# Patient Record
Sex: FEMALE | Race: WHITE | NOT HISPANIC OR LATINO | Employment: UNEMPLOYED | URBAN - METROPOLITAN AREA
[De-identification: names, ages, dates, MRNs, and addresses within clinical notes are randomized per-mention and may not be internally consistent; named-entity substitution may affect disease eponyms.]

---

## 2018-07-31 ENCOUNTER — HOSPITAL ENCOUNTER (EMERGENCY)
Facility: CLINIC | Age: 38
Discharge: HOME | End: 2018-07-31
Attending: PHYSICIAN ASSISTANT | Admitting: PHYSICIAN ASSISTANT
Payer: MEDICARE

## 2018-07-31 VITALS
RESPIRATION RATE: 18 BRPM | HEART RATE: 69 BPM | DIASTOLIC BLOOD PRESSURE: 70 MMHG | WEIGHT: 200 LBS | HEIGHT: 72 IN | SYSTOLIC BLOOD PRESSURE: 122 MMHG | OXYGEN SATURATION: 99 % | BODY MASS INDEX: 27.09 KG/M2 | TEMPERATURE: 98 F

## 2018-07-31 DIAGNOSIS — B96.89 BACTERIAL VAGINOSIS: ICD-10-CM

## 2018-07-31 DIAGNOSIS — N30.00 ACUTE CYSTITIS WITHOUT HEMATURIA: ICD-10-CM

## 2018-07-31 DIAGNOSIS — N76.0 BACTERIAL VAGINOSIS: ICD-10-CM

## 2018-07-31 LAB
ALBUMIN UR-MCNC: NEGATIVE MG/DL
APPEARANCE UR: CLEAR
BACTERIA #/AREA URNS HPF: ABNORMAL /HPF
BILIRUB UR QL STRIP: NEGATIVE
COLOR UR AUTO: ABNORMAL
GLUCOSE UR STRIP-MCNC: NEGATIVE MG/DL
HCG UR QL: NEGATIVE
HGB UR QL STRIP: NEGATIVE
KETONES UR STRIP-MCNC: NEGATIVE MG/DL
LEUKOCYTE ESTERASE UR QL STRIP: ABNORMAL
MUCOUS THREADS #/AREA URNS LPF: PRESENT /LPF
NITRATE UR QL: POSITIVE
PH UR STRIP: 6 PH (ref 5–7)
RBC #/AREA URNS AUTO: 1 /HPF (ref 0–2)
SOURCE: ABNORMAL
SP GR UR STRIP: 1 (ref 1–1.03)
SPECIMEN SOURCE: ABNORMAL
SQUAMOUS #/AREA URNS AUTO: <1 /HPF (ref 0–1)
UROBILINOGEN UR STRIP-MCNC: 0 MG/DL (ref 0–2)
WBC #/AREA URNS AUTO: 35 /HPF (ref 0–5)
WET PREP SPEC: ABNORMAL

## 2018-07-31 PROCEDURE — 87186 SC STD MICRODIL/AGAR DIL: CPT | Performed by: PHYSICIAN ASSISTANT

## 2018-07-31 PROCEDURE — 81025 URINE PREGNANCY TEST: CPT | Performed by: PHYSICIAN ASSISTANT

## 2018-07-31 PROCEDURE — G0463 HOSPITAL OUTPT CLINIC VISIT: HCPCS | Performed by: PHYSICIAN ASSISTANT

## 2018-07-31 PROCEDURE — 99204 OFFICE O/P NEW MOD 45 MIN: CPT | Mod: Z6 | Performed by: PHYSICIAN ASSISTANT

## 2018-07-31 PROCEDURE — 87086 URINE CULTURE/COLONY COUNT: CPT | Performed by: PHYSICIAN ASSISTANT

## 2018-07-31 PROCEDURE — 81001 URINALYSIS AUTO W/SCOPE: CPT | Performed by: PHYSICIAN ASSISTANT

## 2018-07-31 PROCEDURE — 87210 SMEAR WET MOUNT SALINE/INK: CPT | Performed by: PHYSICIAN ASSISTANT

## 2018-07-31 PROCEDURE — 87088 URINE BACTERIA CULTURE: CPT | Performed by: PHYSICIAN ASSISTANT

## 2018-07-31 RX ORDER — METRONIDAZOLE 500 MG/1
500 TABLET ORAL 2 TIMES DAILY
Qty: 14 TABLET | Refills: 0 | Status: SHIPPED | OUTPATIENT
Start: 2018-07-31 | End: 2018-08-07

## 2018-07-31 RX ORDER — NITROFURANTOIN 25; 75 MG/1; MG/1
100 CAPSULE ORAL 2 TIMES DAILY
Qty: 14 CAPSULE | Refills: 0 | Status: SHIPPED | OUTPATIENT
Start: 2018-07-31 | End: 2018-08-07

## 2018-07-31 RX ORDER — FLUCONAZOLE 150 MG/1
TABLET ORAL
Qty: 2 TABLET | Refills: 0 | Status: SHIPPED | OUTPATIENT
Start: 2018-07-31 | End: 2018-08-03

## 2018-07-31 ASSESSMENT — ENCOUNTER SYMPTOMS
BACK PAIN: 0
VOMITING: 0
NAUSEA: 0
FLANK PAIN: 0
DYSURIA: 1
FREQUENCY: 1
DIARRHEA: 0
ABDOMINAL PAIN: 0
FEVER: 0

## 2018-07-31 NOTE — DISCHARGE INSTRUCTIONS
Use Medication as directed; no alcohol with flagyl will cause vomiting.     Patient advised to call for any lab results (if obtained during visit) within 2-3 days. Urine culture pending.   Drink plenty of fluids.     Prevention and treatment of UTI's discussed.  Signs and symptoms of pyelonephritis mentioned.    Patient to return to clinic sooner if not improving as expected.   Go to ER if any worsening symptoms or signs/symptoms of phylonephritis occur.

## 2018-07-31 NOTE — ED AVS SNAPSHOT
[facility] Emergency Department    [address] [address]    Phone:  [phone]    Fax:  [phone]                                       Darlene Grigsby   MRN: [id number]    Department:  [facility] Emergency Department   Date of Visit:  7/31/2018           After Visit Summary Signature Page     I have received my discharge instructions, and my questions have been answered. I have discussed any challenges I see with this plan with the nurse or doctor.    ..........................................................................................................................................  Patient/Patient Representative Signature      ..........................................................................................................................................  Patient Representative Print Name and Relationship to Patient    ..................................................               ................................................  Date                                            Time    ..........................................................................................................................................  Reviewed by Signature/Title    ...................................................              ..............................................  Date                                                            Time

## 2018-07-31 NOTE — ED AVS SNAPSHOT
[facility] Emergency Department    [address] [address]    Phone:  [phone]    Fax:  [phone]                                       Darlene Grigsby   MRN: [id number]    Department:  [facility] Emergency Department   Date of Visit:  7/31/2018           Patient Information     Date Of Birth          1980        Your diagnoses for this visit were:     Acute cystitis without hematuria     Bacterial vaginosis        You were seen by Tiff Crisostomo PA-C.      Follow-up Information     Follow up with No Ref-Primary, Physician.    Why:  As needed        Follow up with [facility] Emergency Department.    Specialty:  EMERGENCY MEDICINE    Why:  As needed, If symptoms worsen    Contact information:    [address] [address] [phone]    Additional information:    The medical center is located at   [address]. (between [address] and   [address] in [place], [place] miles north   of [place]).        Discharge Instructions       Use Medication as directed; no alcohol with flagyl will cause vomiting.     Patient advised to call for any lab results (if obtained during visit) within 2-3 days. Urine culture pending.   Drink plenty of fluids.     Prevention and treatment of UTI's discussed.  Signs and symptoms of pyelonephritis mentioned.    Patient to return to clinic sooner if not improving as expected.   Go to ER if any worsening symptoms or signs/symptoms of phylonephritis occur.             24 Hour Appointment Hotline       To make an appointment at any [facility] clinic, call [phone] ([phone]). If you don't have a family doctor or clinic, we will help you find one. [facility] clinics are conveniently located to serve the needs of you and your family.             Review of your medicines      START taking        Dose / Directions Last dose taken    fluconazole 150 MG tablet   Commonly known as:  DIFLUCAN   Quantity:  2 tablet         Take one at first signs/symptoms of yeast infection, and can repeat in 1 week if symptoms persist.   Refills:  0        metroNIDAZOLE 500 MG tablet   Commonly known as:  FLAGYL   Dose:  500 mg   Quantity:  14 tablet        Take 1 tablet (500 mg) by mouth 2 times daily for 7 days   Refills:  0        nitroFURantoin (macrocrystal-monohydrate) 100 MG capsule   Commonly known as:  MACROBID   Dose:  100 mg   Quantity:  14 capsule        Take 1 capsule (100 mg) by mouth 2 times daily for 7 days   Refills:  0          Our records show that you are taking the medicines listed below. If these are incorrect, please call your family doctor or clinic.        Dose / Directions Last dose taken    guaiFENesin-codeine 100-10 MG/5ML Soln solution   Commonly known as:  ROBITUSSIN AC   Dose:  1-2 tsp.   Quantity:  180 mL        Take 5-10 mLs by mouth every 6 hours as needed.   Refills:  0        TYLENOL COLD PLUS COUGH CHILD PO        Take  by mouth.   Refills:  0                Prescriptions were sent or printed at these locations (3 Prescriptions)                   [facility] [address] [place] [address] AT [address]   [address] [facility] [address]    Telephone:  [phone]   Fax:  [phone]   Hours:                  E-Prescribed (2 of 3)         metroNIDAZOLE (FLAGYL) 500 MG tablet               nitroFURantoin, macrocrystal-monohydrate, (MACROBID) 100 MG capsule                 Printed at Department/Unit printer (1 of 3)         fluconazole (DIFLUCAN) 150 MG tablet                Procedures and tests performed during your visit     HCG qualitative urine (UPT)    UA reflex to Microscopic    Urine Culture    Wet prep      Orders Needing Specimen Collection     None      Pending Results     Date and Time Order Name Status Description    7/31/2018 1734 Urine Culture In process             Pending Culture Results     Date and Time Order Name Status Description    7/31/2018  1734 Urine Culture In process             Pending Results Instructions     If you had any lab results that were not finalized at the time of your Discharge, you can call the ED Lab Result RN at [phone]. You will be contacted by this team for any positive Lab results or changes in treatment. The nurses are available 7 days a week from 10A to 6:30P.  You can leave a message 24 hours per day and they will return your call.        Test Results From Your Hospital Stay        7/31/2018  4:44 PM      Component Results     Component Value Ref Range & Units Status    Color Urine Suad  Final    Appearance Urine Clear  Final    Glucose Urine Negative NEG^Negative mg/dL Final    Bilirubin Urine Negative NEG^Negative Final    Ketones Urine Negative NEG^Negative mg/dL Final    Specific Gravity Urine 1.003 1.003 - 1.035 Final    Blood Urine Negative NEG^Negative Final    pH Urine 6.0 5.0 - 7.0 pH Final    Protein Albumin Urine Negative NEG^Negative mg/dL Final    Urobilinogen mg/dL 0.0 0.0 - 2.0 mg/dL Final    Nitrite Urine Positive (A) NEG^Negative Final    Leukocyte Esterase Urine Small (A) NEG^Negative Final    Source Midstream Urine  Final    RBC Urine 1 0 - 2 /HPF Final    WBC Urine 35 (H) 0 - 5 /HPF Final    Bacteria Urine Moderate (A) NEG^Negative /HPF Final    Squamous Epithelial /HPF Urine <1 0 - 1 /HPF Final    Mucous Urine Present (A) NEG^Negative /LPF Final         7/31/2018  4:35 PM      Component Results     Component Value Ref Range & Units Status    HCG Qual Urine Negative NEG^Negative Final    This test is for screening purposes.  Results should be interpreted along with   the clinical picture.  Confirmation testing is available if warranted by   ordering [id number], HCG Quantitative Pregnancy.           7/31/2018  5:21 PM      Component Results     Component    Specimen Description    Vagina    Wet Prep    No Trichomonas seen    Wet Prep    No yeast seen    Wet Prep    Few  WBC'S seen      Wet Prep (Abnormal)  "   Clue cells seen         2018  5:39 PM                Thank you for choosing [facility]       Thank you for choosing [facility] for your care. Our goal is always to provide you with excellent care. Hearing back from our patients is one way we can continue to improve our services. Please take a few minutes to complete the written survey that you may receive in the mail after you visit with us. Thank you!        [employer] Information     [employer]! lets you send messages to your doctor, view your test results, renew your prescriptions, schedule appointments and more. To sign up, go to [email or web address] . Click on \"Log in\" on the left side of the screen, which will take you to the Welcome page. Then click on \"Sign up Now\" on the right side of the page.     You will be asked to enter the access code listed below, as well as some personal information. Please follow the directions to create your username and password.     Your access code is: [email or web address]  Expires: 10/29/2018  5:40 PM     Your access code will  in 90 days. If you need help or a new code, please call your [facility] clinic or [phone].        Care EveryWhere ID     This is your Care EveryWhere ID. This could be used by other organizations to access your [facility] medical records  [id number]        Equal Access to Services     DENIA ARGUELLO AH: Roosevelt roa Soelizabeth, waaxda luqadaha, qaybta kaalmada bran, jem santos. So [facility] [phone].    ATENCIÓN: Si habla español, tiene a luo disposición servicios gratuitos de asistencia lingüística. Llame al [phone].    We comply with applicable federal civil rights laws and [place] laws. We do not discriminate on the basis of race, color, national origin, age, disability, sex, sexual orientation, or gender identity.            After Visit Summary       This is your record. Keep this with you and show to your community pharmacist(s) and doctor(s) at your next " visit.

## 2018-07-31 NOTE — ED PROVIDER NOTES
History     Chief Complaint   Patient presents with     UTI     small possibility of pg also     HPI    Darlene Grigsby is a 38 year old female who  presents today with urinary symptoms. Symptoms of dysuria, urgency, frequency and burning have been going on for 1week(s) that is better in the morning and worse in the evenings.  Hematuria no.  gradual onset, still present and waxes and wanes and mild.  This patient does not have a history of urinary tract infections.   Vaginal symptoms slight mild yellowish discharge     Any history of STDs HPV positive in past with normal pap smears since.   Patient currently sexually active  Patient denies back pain, fevers, abdominal pain, nausea/vomiting, or vaginal pain.    Problem list, Medication list, Allergies, and Medical/Social/Surgical histories reviewed in [employer] and updated as appropriate.    Problem List:    There are no active problems to display for this patient.       Past Medical History:    No past medical history on file.    Past Surgical History:    No past surgical history on file.    Family History:    No family history on file.    Social History:  Marital Status:  Single [1]  Social History   Substance Use Topics     Smoking status: Never Smoker     Smokeless tobacco: Not on file     Alcohol use Yes      Comment: seldom        Medications:      fluconazole (DIFLUCAN) 150 MG tablet   metroNIDAZOLE (FLAGYL) 500 MG tablet   nitroFURantoin, macrocrystal-monohydrate, (MACROBID) 100 MG capsule   guaiFENesin-codeine (ROBITUSSIN AC) 100-10 MG/5ML SOLN   [id number] (TYLENOL COLD PLUS COUGH CHILD PO)         Review of Systems   Constitutional: Negative for fever.   Gastrointestinal: Negative for abdominal pain, diarrhea, nausea and vomiting.   Genitourinary: Positive for dysuria, frequency, urgency and vaginal discharge. Negative for flank pain, pelvic pain, vaginal bleeding and vaginal pain.   Musculoskeletal: Negative for back pain.   All other systems  reviewed and are negative.      Physical Exam   BP: 122/70  Pulse: 69  Temp: 98  F (36.7  C)  Resp: 18  Height: 182.9 cm (6')  Weight: 90.7 kg (200 lb)  SpO2: 99 %      Physical Exam     /70  Pulse 69  Temp 98  F (36.7  C) (Temporal)  Resp 18  Ht 1.829 m (6')  Wt 90.7 kg (200 lb)  SpO2 99%  BMI 27.12 kg/m2    GENERAL APPEARANCE: healthy, alert and no distress  RESP: lungs clear to auscultation - no rales, rhonchi or wheezes  CV: regular rates and rhythm, normal S1 S2, no murmur noted  ABDOMEN:  soft, nontender, no HSM or masses and bowel sounds normal  BACK: No CVA tenderness  GU_female: deferred, patient obtained wet prep herself in office.   SKIN: no suspicious lesions or rashes    Offered GC/Chlamydia testing in office today; patient declined and declined pelvic exam.     Results for orders placed or performed during the hospital encounter of 07/31/18 (from the past 24 hour(s))   HCG qualitative urine (UPT)   Result Value Ref Range    HCG Qual Urine Negative NEG^Negative     Results for orders placed or performed during the hospital encounter of 07/31/18   UA reflex to Microscopic   Result Value Ref Range    Color Urine Suad     Appearance Urine Clear     Glucose Urine Negative NEG^Negative mg/dL    Bilirubin Urine Negative NEG^Negative    Ketones Urine Negative NEG^Negative mg/dL    Specific Gravity Urine 1.003 1.003 - 1.035    Blood Urine Negative NEG^Negative    pH Urine 6.0 5.0 - 7.0 pH    Protein Albumin Urine Negative NEG^Negative mg/dL    Urobilinogen mg/dL 0.0 0.0 - 2.0 mg/dL    Nitrite Urine Positive (A) NEG^Negative    Leukocyte Esterase Urine Small (A) NEG^Negative    Source Midstream Urine     RBC Urine 1 0 - 2 /HPF    WBC Urine 35 (H) 0 - 5 /HPF    Bacteria Urine Moderate (A) NEG^Negative /HPF    Squamous Epithelial /HPF Urine <1 0 - 1 /HPF    Mucous Urine Present (A) NEG^Negative /LPF   HCG qualitative urine (UPT)   Result Value Ref Range    HCG Qual Urine Negative NEG^Negative   Wet  prep   Result Value Ref Range    Specimen Description Vagina     Wet Prep No Trichomonas seen     Wet Prep No yeast seen     Wet Prep Few  WBC'S seen       Wet Prep Clue cells seen (A)              ED Course     ED Course     Procedures              Critical Care time:  none               Results for orders placed or performed during the hospital encounter of 07/31/18 (from the past 24 hour(s))   UA reflex to Microscopic   Result Value Ref Range    Color Urine Suad     Appearance Urine Clear     Glucose Urine Negative NEG^Negative mg/dL    Bilirubin Urine Negative NEG^Negative    Ketones Urine Negative NEG^Negative mg/dL    Specific Gravity Urine 1.003 1.003 - 1.035    Blood Urine Negative NEG^Negative    pH Urine 6.0 5.0 - 7.0 pH    Protein Albumin Urine Negative NEG^Negative mg/dL    Urobilinogen mg/dL 0.0 0.0 - 2.0 mg/dL    Nitrite Urine Positive (A) NEG^Negative    Leukocyte Esterase Urine Small (A) NEG^Negative    Source Midstream Urine     RBC Urine 1 0 - 2 /HPF    WBC Urine 35 (H) 0 - 5 /HPF    Bacteria Urine Moderate (A) NEG^Negative /HPF    Squamous Epithelial /HPF Urine <1 0 - 1 /HPF    Mucous Urine Present (A) NEG^Negative /LPF   HCG qualitative urine (UPT)   Result Value Ref Range    HCG Qual Urine Negative NEG^Negative   Wet prep   Result Value Ref Range    Specimen Description Vagina     Wet Prep No Trichomonas seen     Wet Prep No yeast seen     Wet Prep Few  WBC'S seen       Wet Prep Clue cells seen (A)        Medications - No data to display    Assessments & Plan (with Medical Decision Making)     I have reviewed the nursing notes.    I have reviewed the findings, diagnosis, plan and need for follow up with the patient.   UTI: treated with macrobid. Urine culture pending.  Patient informed of signs and symptoms of pyelonephritis to watch for and if these symptoms occur she is to return to the ED for further treatment and evaluation.    Bacterial vaginosis: treated with flagyl. Patient informed of  no alcohol while on this can cause vomiting.     Patient also given printed off prescription for Diflucan to use if she develops a vaginal yeast infection.  Patient forms of signs and symptoms to watch for and if this occurs she is to take this prescription.    She to follow-up with primary care provider in 1-2 weeks symptoms persist or fail to improve.    New Prescriptions    FLUCONAZOLE (DIFLUCAN) 150 MG TABLET    Take one at first signs/symptoms of yeast infection, and can repeat in 1 week if symptoms persist.    METRONIDAZOLE (FLAGYL) 500 MG TABLET    Take 1 tablet (500 mg) by mouth 2 times daily for 7 days    NITROFURANTOIN, MACROCRYSTAL-MONOHYDRATE, (MACROBID) 100 MG CAPSULE    Take 1 capsule (100 mg) by mouth 2 times daily for 7 days       Final diagnoses:   Acute cystitis without hematuria   Bacterial vaginosis       7/31/2018   [facility] EMERGENCY DEPARTMENT     Tiff Crisostomo PA-C  07/31/18 5821

## 2018-08-03 ENCOUNTER — TELEPHONE (OUTPATIENT)
Dept: EMERGENCY MEDICINE | Facility: CLINIC | Age: 38
End: 2018-08-03

## 2018-08-03 DIAGNOSIS — N30.00 ACUTE CYSTITIS WITHOUT HEMATURIA: ICD-10-CM

## 2018-08-03 LAB
BACTERIA SPEC CULT: ABNORMAL
BACTERIA SPEC CULT: ABNORMAL
Lab: ABNORMAL
SPECIMEN SOURCE: ABNORMAL

## 2018-08-03 RX ORDER — SULFAMETHOXAZOLE/TRIMETHOPRIM 800-160 MG
1 TABLET ORAL 2 TIMES DAILY
Qty: 6 TABLET | Refills: 0 | Status: CANCELLED | OUTPATIENT
Start: 2018-08-03 | End: 2018-08-06

## 2018-08-03 NOTE — TELEPHONE ENCOUNTER
[facility] Emergency Department Lab result notification [Adult-Female]    [place] ED lab result protocol used  Urine Culture    Reason for call  Notify of lab results, assess symptoms,  review ED providers recommendations/discharge instructions (if necessary) and advise per ED lab result f/u protocol    Lab Result (including Rx patient on, if applicable)  Final Urine Culture Report on 8/3/18  Emergency Dept discharge antibiotic prescribed: Nitrofurantoin Macrocrystal-Monohydrate (Macrobid) 100 mg PO capsule, 1 capsule (100 mg) by mouth 2 times daily for 7 days  #1. Bacteria, 10,000 - 50,000 colonies/mL Proteus mirabilis,  is [RESISTANT] to antibiotic.   Change in treatment as per [place] ED Lab result protocol    Information table from ED Provider visit on 7/31/18  ED diagnosis Acute cystitis without hematuria   ED provider Tiff Crisostomo PA-C   Symptoms reported at ED visit (Chief complaint, HPI) Darlene Grigsby is a 38 year old female who  presents today with urinary symptoms. Symptoms of dysuria, urgency, frequency and burning have been going on for 1week(s) that is better in the morning and worse in the evenings.  Hematuria no.  gradual onset, still present and waxes and wanes and mild.  This patient does not have a history of urinary tract infections.   Vaginal symptoms slight mild yellowish discharge      Any history of STDs HPV positive in past with normal pap smears since.   Patient currently sexually active  Patient denies back pain, fevers, abdominal pain, nausea/vomiting, or vaginal pain.      ED providers Impression and Plan (applicable information)  UTI: treated with macrobid. Urine culture pending.  Patient informed of signs and symptoms of pyelonephritis to watch for and if these symptoms occur she is to return to the ED for further treatment and evaluation.     Bacterial vaginosis: treated with flagyl. Patient informed of no alcohol while on this can cause vomiting.      Patient  also given printed off prescription for Diflucan to use if she develops a vaginal yeast infection.  Patient forms of signs and symptoms to watch for and if this occurs she is to take this prescription.     She to follow-up with primary care provider in 1-2 weeks symptoms persist or fail to improve   Significant Medical hx, if applicable None   Coumadin/Warfarin [Yes /No] No   Creatinine Level (mg/dl) Not available   Creatinine clearance (ml/min), if applicable Not available   Pregnant (Yes/No/NA) No   Breastfeeding (Yes/No/NA) No   Allergies Contrast dye   Weight, if applicable N/A      RN Assessment (Patient s current Symptoms), include time called.  [Insert Left message here if message left]  Msg left 8/3/18 & 8/4/18.  Medication pending patient contact.      PCP follow-up Questions asked: NO    Mable Arizmendi RN    [facility] RN  Lung Nodule and ED Lab Results F/U RN  Epic pool (ED late result f/u RN) : P [id number]   # [phone]    Copy of Lab result   Order   Urine Culture [[id number]] (Order [id number])   Exam Information   Exam Date Exam Time Accession # Results    7/31/18  4:15 PM [id number]    Component Results   Component Collected Lab   Specimen Description 07/31/2018  4:15    Midstream Urine   Special Requests 07/31/2018  4:15 PM 75   Specimen received in preservative   Culture Micro (Abnormal) 07/31/2018  4:15    10,000 to 50,000 colonies/mL   Proteus mirabilis      Culture Micro 07/31/2018  4:15    <10,000 colonies/mL   mixed urogenital jaden      Culture & Susceptibility   PROTEUS MIRABILIS   Antibiotic Interpretation Sensitivity Unit Method Status   AMPICILLIN Sensitive <=2 ug/mL ARACELY Final   AMPICILLIN/SULBACTAM Sensitive <=2 ug/mL ARACELY Final   CEFAZOLIN Sensitive <=4 ug/mL ARACELY Final   Comment: Cefazolin ARACELY breakpoints are for the treatment of uncomplicated urinary tract   infections.  For the treatment of systemic infections, please contact the   laboratory for  additional testing.   CEFEPIME Sensitive <=1 ug/mL ARACELY Final   CEFOXITIN Sensitive <=4 ug/mL ARACELY Final   CEFTAZIDIME Sensitive <=1 ug/mL ARACELY Final   CEFTRIAXONE Sensitive <=1 ug/mL ARACELY Final   CIPROFLOXACIN Sensitive <=0.25 ug/mL ARACELY Final   GENTAMICIN Sensitive <=1 ug/mL ARACELY Final   LEVOFLOXACIN Sensitive <=0.12 ug/mL ARACELY Final   NITROFURANTOIN Resistant 128 ug/mL ARACELY Final   Comment: Intrinsically Resistant   Piperacillin/Tazo Sensitive <=4 ug/mL ARACELY Final   TOBRAMYCIN Sensitive <=1 ug/mL ARACELY Final   Trimethoprim/Sulfa Sensitive <=1/19 ug/mL ARACELY Final

## 2018-08-10 NOTE — TELEPHONE ENCOUNTER
[facility] Emergency Department Lab result notification     Patient/parent Name  Darlene Grigsby    RN Assessment (Patient s current Symptoms), include time called.  [Insert Left message here if message left]  Is now asymptomatic.    RN Recommendations/Instructions per [facility] ED lab result protocol  F/u with PCP for re stesting as desired.       Please Contact your PCP clinic or return to the Emergency department if your:    Symptoms return.    Symptoms worsen or other concerning symptom's.    PCP follow-up Questions asked: YES       Mable Arizmendi RN    [facility] RN  Lung Nodule and ED Lab Results F/U RN  Epic pool (ED late result f/u RN) : P [id number]   # [phone]

## 2018-08-28 ENCOUNTER — OFFICE VISIT (OUTPATIENT)
Dept: FAMILY MEDICINE | Facility: CLINIC | Age: 38
End: 2018-08-28
Payer: MEDICAID

## 2018-08-28 VITALS
SYSTOLIC BLOOD PRESSURE: 118 MMHG | HEIGHT: 71 IN | WEIGHT: 198.4 LBS | HEART RATE: 78 BPM | TEMPERATURE: 98.5 F | OXYGEN SATURATION: 98 % | DIASTOLIC BLOOD PRESSURE: 78 MMHG | BODY MASS INDEX: 27.77 KG/M2

## 2018-08-28 DIAGNOSIS — R00.2 PALPITATIONS: ICD-10-CM

## 2018-08-28 DIAGNOSIS — N63.12 BREAST LUMP ON RIGHT SIDE AT 1 O'CLOCK POSITION: Primary | ICD-10-CM

## 2018-08-28 DIAGNOSIS — Z98.890 HISTORY OF BREAST LUMP/MASS EXCISION: ICD-10-CM

## 2018-08-28 PROCEDURE — 99213 OFFICE O/P EST LOW 20 MIN: CPT | Performed by: NURSE PRACTITIONER

## 2018-08-28 RX ORDER — SUMATRIPTAN SUCCINATE 6 MG/.5ML
INJECTION, SOLUTION SUBCUTANEOUS ONCE
COMMUNITY

## 2018-08-28 NOTE — PROGRESS NOTES
SUBJECTIVE:   Darlene Grigsby is a 38 year old female who presents to clinic today for the following health issues:  Chief Complaint   Patient presents with     Breast Problem     lump on right breast, breast cancer runs in the family (aunt and grandma)      pulse     feels like her pulse is racing and is higher lately   Patient has anxiety and is not sure if she can correlate this to her anxiety.  When it occurs it lasts a short time and goes up to 100-120.  No chest pain or shortness of breath with symptoms.      Lump on Right breast      Duration: Just noticed it a couple days ago    Description (location/character/radiation): tender to touch, above the nipple    Intensity:  mild    Accompanying signs and symptoms: none    History (similar episodes/previous evaluation): yes have had a lump right next to it 1-2 years ago     Problem list and histories reviewed & adjusted, as indicated.  Additional history: as documented    Patient Active Problem List   Diagnosis   (none) - all problems resolved or deleted     No past surgical history on file.    Social History   Substance Use Topics     Smoking status: Never Smoker     Smokeless tobacco: Never Used     Alcohol use Yes      Comment: seldom     Family History   Problem Relation Age of Onset     Breast Cancer Maternal Grandmother      Breast Cancer Other          Current Outpatient Prescriptions   Medication Sig Dispense Refill     SUMATRIPTAN SUCCINATE PO        SUMAtriptan Succinate Refill (IMITREX) 6 MG/0.5ML SOCT Inject Subcutaneous once       guaiFENesin-codeine (ROBITUSSIN AC) 100-10 MG/5ML SOLN Take 5-10 mLs by mouth every 6 hours as needed. (Patient not taking: Reported on 8/28/2018) 180 mL 0     Pzsybuhxb-PRQ-GX-APAP (TYLENOL COLD PLUS COUGH CHILD PO) Take  by mouth.       Allergies   Allergen Reactions     Contrast Dye      Macrobid [Nitrofurantoin]      Felt like med was giving her seizures, did see flickering lights while on it.       Reviewed and  "updated as needed this visit by clinical staff  Allergies  Meds  Problems       Reviewed and updated as needed this visit by Provider  Allergies  Meds  Problems         ROS:  CONSTITUTIONAL: NEGATIVE for fever, chills, change in weight  RESP: NEGATIVE for significant cough or SOB  CV: NEGATIVE for chest pain, palpitations or peripheral edema  GI: NEGATIVE for nausea, abdominal pain, heartburn, or change in bowel habits  BREAST:  Lump in right upper breast just next to incision from previous lumpectomy.  Patient had benign tissue on excision of prior lump in this area.   PSYCHIATRIC: NEGATIVE for changes in mood or affect, hx of anxiety  ROS otherwise negative    OBJECTIVE:     /78  Pulse 78  Temp 98.5  F (36.9  C) (Tympanic)  Ht 5' 11\" (1.803 m)  Wt 198 lb 6.4 oz (90 kg)  SpO2 98%  BMI 27.67 kg/m2  Body mass index is 27.67 kg/(m^2).  GENERAL: healthy, alert and no distress  RESP: lungs clear to auscultation - no rales, rhonchi or wheezes  BREAST: no palpable axillary masses or adenopathy, mass right breast 2 o'clock just medial to prior excision incision that is mobile and pea sized, tenderness right mass and well healed biopsy incision right upper breast just medial from center.  CV: regular rate and rhythm, normal S1 S2, no S3 or S4, no murmur, click or rub, no peripheral edema and peripheral pulses strong  PSYCH: mentation appears normal, affect normal/bright    Diagnostic Test Results:  none     ASSESSMENT/PLAN:     1. Breast lump on right side at 1 o'clock position  Ordered Mammo and US for evaluation.  Pt will schedule and she will be notified of results.    - MA Diagnostic Digital Right; Future  - US Breast Right Complete 4 Quadrants; Future    2. History of breast lump/mass excision  Ordered Mammo and US for evaluation.  Patient will schedule and she will be notified of results.  - MA Diagnostic Digital Right; Future  - US Breast Right Complete 4 Quadrants; Future    3. Palpitations  Not " regular symptoms.  Currently normal pulse.  Patient was concerned that HR of 100 was high.  Has been under some stress but not sure if this is related.  No symptoms with exertion or exercise.  Denies any syncope or near syncope with episodes.   Discussed for her to check her heart rate when this occurs to evaluate over 1 minute how many beats she has.  She should date and time and write any precipitating occurrences that happen also.  If the HR is over 100 and consistent or persistent, would recommend f/u with holter monitor to evaluate.        See Patient Instructions    Lisbeth Cody NP  [facility]

## 2018-08-28 NOTE — MR AVS SNAPSHOT
After Visit Summary   8/28/2018    Darlene Grigsby    MRN: [id number]           Patient Information     Date Of Birth          1980        Visit Information        Provider Department      8/28/2018 11:00 AM Lisbeth Cody NP [facility]        Today's Diagnoses     Breast lump on right side at 1 o'clock position    -  1    History of breast lump/mass excision          Care Instructions    1.  Schedule Mammography and US of right breast in Radiology.  2.  Use tylenol or Ibuprofen for discomfort.  3.  Either myself or radiologist will follow-up with you on results.  4.  Track pulse when you think it is high and count beats over 1 minute.  Record Date, Time and anything occurring around the time it happens.  If this is high and frequent, follow-up in clinic.  Normal heart rate is .          Follow-ups after your visit        Follow-up notes from your care team     Return if symptoms worsen or fail to improve.      Future tests that were ordered for you today     Open Future Orders        Priority Expected Expires Ordered    MA Diagnostic Digital Right Routine  8/28/2019 8/28/2018    US Breast Right Complete 4 Quadrants Routine  8/28/2019 8/28/2018            Who to contact     If you have questions or need follow up information about today's clinic visit or your schedule please contact [facility] directly at [phone].  Normal or non-critical lab and imaging results will be communicated to you by MyChart, letter or phone within 4 business days after the clinic has received the results. If you do not hear from us within 7 days, please contact the clinic through MyChart or phone. If you have a critical or abnormal lab result, we will notify you by phone as soon as possible.  Submit refill requests through Appear Here or call your pharmacy and they will forward the refill request to us. Please allow 3 business days for your refill to be completed.           "Additional Information About Your Visit        MyChart Information     Headroom lets you send messages to your doctor, view your test results, renew your prescriptions, schedule appointments and more. To sign up, go to [email or web address] . Click on \"Log in\" on the left side of the screen, which will take you to the Welcome page. Then click on \"Sign up Now\" on the right side of the page.     You will be asked to enter the access code listed below, as well as some personal information. Please follow the directions to create your username and password.     Your access code is: [email or web address]  Expires: 10/29/2018  5:40 PM     Your access code will  in 90 days. If you need help or a new code, please call your [place] clinic or [phone].        Care EveryWhere ID     This is your Care EveryWhere ID. This could be used by other organizations to access your [place] medical records  [id number]        Your Vitals Were     Pulse Temperature Height Pulse Oximetry BMI (Body Mass Index)       78 98.5  F (36.9  C) (Tympanic) 5' 11\" (1.803 m) 98% 27.67 kg/m2        Blood Pressure from Last 3 Encounters:   18 118/78   18 122/70   12 130/84    Weight from Last 3 Encounters:   18 198 lb 6.4 oz (90 kg)   18 200 lb (90.7 kg)   12 180 lb (81.6 kg)               Primary Care Provider Fax #    Physician No Ref-Primary [phone]       No address on file        Equal Access to Services     [facility]: Hadii kat roa Soelizabeth, waaxda luqadaha, qaybta kaalmada jem sotomayor. So [facility] [phone].    ATENCIÓN: Si habla español, tiene a luo disposición servicios gratuitos de asistencia lingüística. Llame al [phone].    We comply with applicable federal civil rights laws and [place] laws. We do not discriminate on the basis of race, color, national origin, age, disability, sex, sexual orientation, or gender identity.            Thank you!  "    Thank you for choosing [facility]  for your care. Our goal is always to provide you with excellent care. Hearing back from our patients is one way we can continue to improve our services. Please take a few minutes to complete the written survey that you may receive in the mail after your visit with us. Thank you!             Your Updated Medication List - Protect others around you: Learn how to safely use, store and throw away your medicines at www.disposemymeds.org.          This list is accurate as of 8/28/18 11:58 AM.  Always use your most recent med list.                   Brand Name Dispense Instructions for use Diagnosis    guaiFENesin-codeine 100-10 MG/5ML Soln solution    ROBITUSSIN AC    180 mL    Take 5-10 mLs by mouth every 6 hours as needed.    Cough, URI (upper respiratory infection)       * SUMAtriptan Succinate Refill 6 MG/0.5ML Soct    IMITREX     Inject Subcutaneous once        * SUMATRIPTAN SUCCINATE PO           TYLENOL COLD PLUS COUGH CHILD PO      Take  by mouth.    Acute pharyngitis       * Notice:  This list has 2 medication(s) that are the same as other medications prescribed for you. Read the directions carefully, and ask your doctor or other care provider to review them with you.

## 2018-08-28 NOTE — PATIENT INSTRUCTIONS
1.  Schedule Mammography and US of right breast in Radiology.  2.  Use tylenol or Ibuprofen for discomfort.  3.  Either myself or radiologist will follow-up with you on results.  4.  Track pulse when you think it is high and count beats over 1 minute.  Record Date, Time and anything occurring around the time it happens.  If this is high and frequent, follow-up in clinic.  Normal heart rate is .

## 2018-09-05 ENCOUNTER — HOSPITAL ENCOUNTER (OUTPATIENT)
Dept: ULTRASOUND IMAGING | Facility: CLINIC | Age: 38
End: 2018-09-05
Attending: NURSE PRACTITIONER
Payer: MEDICAID

## 2018-09-05 ENCOUNTER — HOSPITAL ENCOUNTER (OUTPATIENT)
Dept: MAMMOGRAPHY | Facility: CLINIC | Age: 38
Discharge: HOME | End: 2018-09-05
Attending: NURSE PRACTITIONER | Admitting: NURSE PRACTITIONER
Payer: MEDICAID

## 2018-09-05 DIAGNOSIS — N63.12 BREAST LUMP ON RIGHT SIDE AT 1 O'CLOCK POSITION: ICD-10-CM

## 2018-09-05 DIAGNOSIS — Z98.890 HISTORY OF BREAST LUMP/MASS EXCISION: ICD-10-CM

## 2018-09-05 PROCEDURE — 76642 ULTRASOUND BREAST LIMITED: CPT | Mod: RT

## 2018-09-05 PROCEDURE — G0279 TOMOSYNTHESIS, MAMMO: HCPCS

## 2018-09-09 ENCOUNTER — HEALTH MAINTENANCE LETTER (OUTPATIENT)
Age: 38
End: 2018-09-09

## 2019-12-02 ENCOUNTER — OFFICE VISIT (OUTPATIENT)
Dept: OBGYN | Facility: CLINIC | Age: 39
End: 2019-12-02
Payer: COMMERCIAL

## 2019-12-02 VITALS
BODY MASS INDEX: 26.19 KG/M2 | HEART RATE: 70 BPM | WEIGHT: 193.4 LBS | RESPIRATION RATE: 14 BRPM | SYSTOLIC BLOOD PRESSURE: 114 MMHG | HEIGHT: 72 IN | DIASTOLIC BLOOD PRESSURE: 74 MMHG | TEMPERATURE: 98.1 F

## 2019-12-02 DIAGNOSIS — Z11.3 SCREEN FOR STD (SEXUALLY TRANSMITTED DISEASE): ICD-10-CM

## 2019-12-02 DIAGNOSIS — Z00.00 ROUTINE GENERAL MEDICAL EXAMINATION AT A HEALTH CARE FACILITY: Primary | ICD-10-CM

## 2019-12-02 PROCEDURE — G0145 SCR C/V CYTO,THINLAYER,RESCR: HCPCS | Performed by: OBSTETRICS & GYNECOLOGY

## 2019-12-02 PROCEDURE — 87624 HPV HI-RISK TYP POOLED RSLT: CPT | Performed by: OBSTETRICS & GYNECOLOGY

## 2019-12-02 PROCEDURE — 87491 CHLMYD TRACH DNA AMP PROBE: CPT | Performed by: OBSTETRICS & GYNECOLOGY

## 2019-12-02 PROCEDURE — 87340 HEPATITIS B SURFACE AG IA: CPT | Performed by: OBSTETRICS & GYNECOLOGY

## 2019-12-02 PROCEDURE — 36415 COLL VENOUS BLD VENIPUNCTURE: CPT | Performed by: OBSTETRICS & GYNECOLOGY

## 2019-12-02 PROCEDURE — 87389 HIV-1 AG W/HIV-1&-2 AB AG IA: CPT | Performed by: OBSTETRICS & GYNECOLOGY

## 2019-12-02 PROCEDURE — 87591 N.GONORRHOEAE DNA AMP PROB: CPT | Performed by: OBSTETRICS & GYNECOLOGY

## 2019-12-02 PROCEDURE — 99385 PREV VISIT NEW AGE 18-39: CPT | Performed by: OBSTETRICS & GYNECOLOGY

## 2019-12-02 RX ORDER — MAGNESIUM OXIDE 400 MG/1
800 TABLET ORAL
COMMUNITY

## 2019-12-02 RX ORDER — JUNIPER 300 MG
CAPSULE ORAL
COMMUNITY

## 2019-12-02 RX ORDER — CYCLOBENZAPRINE HCL 10 MG
TABLET ORAL
Refills: 11 | COMMUNITY
Start: 2019-11-13 | End: 2023-10-17

## 2019-12-02 RX ORDER — OMEGA-3 FATTY ACIDS/FISH OIL 300-1000MG
CAPSULE ORAL
COMMUNITY

## 2019-12-02 ASSESSMENT — MIFFLIN-ST. JEOR: SCORE: 1669.26

## 2019-12-02 NOTE — LETTER
December 10, 2019    Darlene Grigsby  [address]   [place] [address]    Dear ,  This letter is regarding your recent Pap smear (cervical cancer screening) and Human Papillomavirus (HPV) test.  We are happy to inform you that your Pap smear result is normal. Cervical cancer is closely linked with certain types of HPV. Your results showed no evidence of high-risk HPV.  We recommend you have your next PAP smear and HPV test in 1 year. If your PAP and HPV test are normal next year then you may have a PAP and HPV in 3 years. This is due to the high grade cervical precancer you had in the year 2005.   You will still need to return to the clinic every year for an annual exam and other preventive tests.  If you have additional questions regarding this result, please call our registered nurse, Nikkie at [phone].  Sincerely,    Rebeca Chairez MD/madelaine
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
## 2019-12-02 NOTE — NURSING NOTE
Chief Complaint   Patient presents with     Annual Visit       Initial /74 (BP Location: Right arm, Patient Position: Chair, Cuff Size: Adult Regular)   Pulse 70   Temp 98.1  F (36.7  C) (Tympanic)   Resp 14   Ht 1.829 m (6')   Wt 87.7 kg (193 lb 6.4 oz)   LMP  (LMP Unknown)   BMI 26.23 kg/m   Estimated body mass index is 26.23 kg/m  as calculated from the following:    Height as of this encounter: 1.829 m (6').    Weight as of this encounter: 87.7 kg (193 lb 6.4 oz).  Medications and allergies reviewed.    Molly RAMÍREZ CMA ([facility])

## 2019-12-03 LAB
C TRACH DNA SPEC QL NAA+PROBE: NEGATIVE
HBV SURFACE AG SERPL QL IA: NONREACTIVE
HIV 1+2 AB+HIV1 P24 AG SERPL QL IA: NONREACTIVE
N GONORRHOEA DNA SPEC QL NAA+PROBE: NEGATIVE
SPECIMEN SOURCE: NORMAL
SPECIMEN SOURCE: NORMAL

## 2019-12-03 NOTE — RESULT ENCOUNTER NOTE
Pt notified of below.  Pt reports understanding.  Pt does not have further questions or concerns.    Hannah Amado   [facility]  RN

## 2019-12-04 LAB
COPATH REPORT: NORMAL
PAP: NORMAL

## 2019-12-06 LAB
FINAL DIAGNOSIS: NORMAL
HPV HR 12 DNA CVX QL NAA+PROBE: NEGATIVE
HPV16 DNA SPEC QL NAA+PROBE: NEGATIVE
HPV18 DNA SPEC QL NAA+PROBE: NEGATIVE
SPECIMEN DESCRIPTION: NORMAL
SPECIMEN SOURCE CVX/VAG CYTO: NORMAL

## 2022-07-20 ENCOUNTER — HOSPITAL ENCOUNTER (EMERGENCY)
Facility: CLINIC | Age: 42
Discharge: HOME | End: 2022-07-20
Attending: EMERGENCY MEDICINE | Admitting: EMERGENCY MEDICINE

## 2022-07-20 VITALS
TEMPERATURE: 98.7 F | WEIGHT: 195 LBS | RESPIRATION RATE: 18 BRPM | SYSTOLIC BLOOD PRESSURE: 122 MMHG | HEIGHT: 72 IN | BODY MASS INDEX: 26.41 KG/M2 | HEART RATE: 95 BPM | DIASTOLIC BLOOD PRESSURE: 79 MMHG | OXYGEN SATURATION: 100 %

## 2022-07-20 DIAGNOSIS — L03.115 CELLULITIS OF RIGHT LOWER EXTREMITY: ICD-10-CM

## 2022-07-20 LAB
ALBUMIN SERPL-MCNC: 3.8 G/DL (ref 3.4–5)
ALP SERPL-CCNC: 56 U/L (ref 40–150)
ALT SERPL W P-5'-P-CCNC: 19 U/L (ref 0–50)
ANION GAP SERPL CALCULATED.3IONS-SCNC: 4 MMOL/L (ref 3–14)
AST SERPL W P-5'-P-CCNC: 12 U/L (ref 0–45)
BASOPHILS # BLD AUTO: 0.1 10E3/UL (ref 0–0.2)
BASOPHILS NFR BLD AUTO: 1 %
BILIRUB DIRECT SERPL-MCNC: <0.1 MG/DL (ref 0–0.2)
BILIRUB SERPL-MCNC: 0.3 MG/DL (ref 0.2–1.3)
BUN SERPL-MCNC: 14 MG/DL (ref 7–30)
CALCIUM SERPL-MCNC: 8.8 MG/DL (ref 8.5–10.1)
CHLORIDE BLD-SCNC: 109 MMOL/L (ref 94–109)
CO2 SERPL-SCNC: 29 MMOL/L (ref 20–32)
CREAT SERPL-MCNC: 0.61 MG/DL (ref 0.52–1.04)
EOSINOPHIL # BLD AUTO: 0.2 10E3/UL (ref 0–0.7)
EOSINOPHIL NFR BLD AUTO: 3 %
ERYTHROCYTE [DISTWIDTH] IN BLOOD BY AUTOMATED COUNT: 11.6 % (ref 10–15)
GFR SERPL CREATININE-BSD FRML MDRD: >90 ML/MIN/1.73M2
GLUCOSE BLD-MCNC: 91 MG/DL (ref 70–99)
HCT VFR BLD AUTO: 37.7 % (ref 35–47)
HGB BLD-MCNC: 12.4 G/DL (ref 11.7–15.7)
IMM GRANULOCYTES # BLD: 0 10E3/UL
IMM GRANULOCYTES NFR BLD: 0 %
LYMPHOCYTES # BLD AUTO: 2.6 10E3/UL (ref 0.8–5.3)
LYMPHOCYTES NFR BLD AUTO: 35 %
MCH RBC QN AUTO: 29.4 PG (ref 26.5–33)
MCHC RBC AUTO-ENTMCNC: 32.9 G/DL (ref 31.5–36.5)
MCV RBC AUTO: 89 FL (ref 78–100)
MONOCYTES # BLD AUTO: 0.5 10E3/UL (ref 0–1.3)
MONOCYTES NFR BLD AUTO: 7 %
NEUTROPHILS # BLD AUTO: 4.2 10E3/UL (ref 1.6–8.3)
NEUTROPHILS NFR BLD AUTO: 54 %
NRBC # BLD AUTO: 0 10E3/UL
NRBC BLD AUTO-RTO: 0 /100
PLATELET # BLD AUTO: 254 10E3/UL (ref 150–450)
POTASSIUM BLD-SCNC: 4.3 MMOL/L (ref 3.4–5.3)
PROT SERPL-MCNC: 7.6 G/DL (ref 6.8–8.8)
RBC # BLD AUTO: 4.22 10E6/UL (ref 3.8–5.2)
SODIUM SERPL-SCNC: 142 MMOL/L (ref 133–144)
WBC # BLD AUTO: 7.6 10E3/UL (ref 4–11)

## 2022-07-20 PROCEDURE — 86618 LYME DISEASE ANTIBODY: CPT | Performed by: EMERGENCY MEDICINE

## 2022-07-20 PROCEDURE — 250N000013 HC RX MED GY IP 250 OP 250 PS 637: Performed by: EMERGENCY MEDICINE

## 2022-07-20 PROCEDURE — 86617 LYME DISEASE ANTIBODY: CPT | Performed by: EMERGENCY MEDICINE

## 2022-07-20 PROCEDURE — 250N000011 HC RX IP 250 OP 636: Performed by: EMERGENCY MEDICINE

## 2022-07-20 PROCEDURE — 85025 COMPLETE CBC W/AUTO DIFF WBC: CPT | Performed by: EMERGENCY MEDICINE

## 2022-07-20 PROCEDURE — 99284 EMERGENCY DEPT VISIT MOD MDM: CPT | Performed by: EMERGENCY MEDICINE

## 2022-07-20 PROCEDURE — 82248 BILIRUBIN DIRECT: CPT | Performed by: EMERGENCY MEDICINE

## 2022-07-20 PROCEDURE — 36415 COLL VENOUS BLD VENIPUNCTURE: CPT | Performed by: EMERGENCY MEDICINE

## 2022-07-20 PROCEDURE — 99283 EMERGENCY DEPT VISIT LOW MDM: CPT | Performed by: EMERGENCY MEDICINE

## 2022-07-20 PROCEDURE — 86753 PROTOZOA ANTIBODY NOS: CPT | Performed by: EMERGENCY MEDICINE

## 2022-07-20 PROCEDURE — 80053 COMPREHEN METABOLIC PANEL: CPT | Performed by: EMERGENCY MEDICINE

## 2022-07-20 RX ORDER — DOXYCYCLINE 100 MG/1
100 CAPSULE ORAL ONCE
Status: COMPLETED | OUTPATIENT
Start: 2022-07-20 | End: 2022-07-20

## 2022-07-20 RX ORDER — ONDANSETRON 4 MG/1
4 TABLET, ORALLY DISINTEGRATING ORAL EVERY 6 HOURS PRN
Qty: 12 TABLET | Refills: 0 | Status: SHIPPED | OUTPATIENT
Start: 2022-07-20 | End: 2023-10-17

## 2022-07-20 RX ORDER — DOXYCYCLINE 100 MG/1
100 CAPSULE ORAL 2 TIMES DAILY
Qty: 20 CAPSULE | Refills: 0 | Status: SHIPPED | OUTPATIENT
Start: 2022-07-20 | End: 2022-07-30

## 2022-07-20 RX ORDER — ONDANSETRON 4 MG/1
4 TABLET, ORALLY DISINTEGRATING ORAL ONCE
Status: COMPLETED | OUTPATIENT
Start: 2022-07-20 | End: 2022-07-20

## 2022-07-20 RX ADMIN — ONDANSETRON 4 MG: 4 TABLET, ORALLY DISINTEGRATING ORAL at 19:28

## 2022-07-20 RX ADMIN — DOXYCYCLINE HYCLATE 100 MG: 100 CAPSULE ORAL at 18:06

## 2022-07-20 NOTE — LETTER
July 25, 2022      Darlene Grigsby  [address] [address]        Dear ,    We are writing to inform you of your test results.    Electrolytes and kidney/liver function are normal.    Resulted Orders   Basic metabolic panel   Result Value Ref Range    Sodium 142 133 - 144 mmol/L    Potassium 4.3 3.4 - 5.3 mmol/L    Chloride 109 94 - 109 mmol/L    Carbon Dioxide (CO2) 29 20 - 32 mmol/L    Anion Gap 4 3 - 14 mmol/L    Urea Nitrogen 14 7 - 30 mg/dL    Creatinine 0.61 0.52 - 1.04 mg/dL    Calcium 8.8 8.5 - 10.1 mg/dL    Glucose 91 70 - 99 mg/dL    GFR Estimate >90 >60 mL/min/1.73m2      Comment:      Effective December 21, 2021 eGFRcr in adults is calculated using the 2021 CKD-EPI creatinine equation which includes age and gender (Harmony et al., NE, DOI: 10.1056/ADHMkz1751019)   Lyme Disease Total Abs Bld with Reflex to Confirm CLIA   Result Value Ref Range    Lyme Disease Antibodies Total 4.50 (H) <0.90      Comment:      Reactive, Presence of detectable Borrelia burgdorferi antibodies. A chemiluminescent immunoassay for Lyme IgG and Lyme IgM will be performed for confirmation.   Babesia antibody IgG IgM   Result Value Ref Range    Babesia IgG < 1:16 <1:16      Comment:      INTERPRETIVE INFORMATION: Babesia microti Antibody, IgG      Less than 1:16 ........ Negative - No significant level                            of detectable Babesia IgG                            antibodies.    1:16 .................. Equivocal - Repeat testing in                            10-14 days may be helpful.    Greater than 1:16 ..... Positive - IgG antibodies to                            Babesia detected which may                            indicate a current or previous                            infection.    This test was developed and its performance characteristics   determined by "[facility]. It has not been cleared or   approved by the US Food and Drug Administration. This test   was performed in a CLIA  certified laboratory and is   intended for clinical purposes.    Babesia IgM <1:20 <1:20      Comment:      INTERPRETIVE INFORMATION: Babesia microti Antibody, IgM      Less than 1:20 ........ Negative - No significant level                            of detectable Babesia IgM                            antibodies.    1:20 .................. Equivocal - Repeat testing in                            10-14 days may be helpful.    Greater than 1:20 ..... Positive - IgM antibodies to                            Babesia detected which may                            indicate a current or recent                            infection.    This test was developed and its performance characteristics   determined by [facility]. It has not been cleared or   approved by the US Food and Drug Administration. This test   was performed in a CLIA certified laboratory and is   intended for clinical purposes.  Performed By: [facility]  [address] [place] [id number]  : Hamilton Melgar MD, PhD   Hepatic panel   Result Value Ref Range    Bilirubin Total 0.3 0.2 - 1.3 mg/dL    Bilirubin Direct <0.1 0.0 - 0.2 mg/dL    Protein Total 7.6 6.8 - 8.8 g/dL    Albumin 3.8 3.4 - 5.0 g/dL    Alkaline Phosphatase 56 40 - 150 U/L    AST 12 0 - 45 U/L    ALT 19 0 - 50 U/L   CBC with platelets and differential   Result Value Ref Range    WBC Count 7.6 4.0 - 11.0 10e3/uL    RBC Count 4.22 3.80 - 5.20 10e6/uL    Hemoglobin 12.4 11.7 - 15.7 g/dL    Hematocrit 37.7 35.0 - 47.0 %    MCV 89 78 - 100 fL    MCH 29.4 26.5 - 33.0 pg    MCHC 32.9 31.5 - 36.5 g/dL    RDW 11.6 10.0 - 15.0 %    Platelet Count 254 150 - 450 10e3/uL    % Neutrophils 54 %    % Lymphocytes 35 %    % Monocytes 7 %    % Eosinophils 3 %    % Basophils 1 %    % Immature Granulocytes 0 %    NRBCs per 100 WBC 0 <1 /100    Absolute Neutrophils 4.2 1.6 - 8.3 10e3/uL    Absolute Lymphocytes 2.6 0.8 - 5.3 10e3/uL    Absolute Monocytes 0.5 0.0 - 1.3  10e3/uL    Absolute Eosinophils 0.2 0.0 - 0.7 10e3/uL    Absolute Basophils 0.1 0.0 - 0.2 10e3/uL    Absolute Immature Granulocytes 0.0 <=0.4 10e3/uL    Absolute NRBCs 0.0 10e3/uL   LYME CONFIRM IGG/IGM BY CHEMILUMINESCENT IA BLOOD   Result Value Ref Range    Lyme Confirm IgG by CLIA Instrument Value 22.80 (H) <0.90 Index    Lyme Confirm IgG by CLIA Blood Reactive (A) Nonreactive      Comment:      Detection of specific anti-Borrelia burgdorferi IgG antibodies. IgG-class antibodies may remain detectable for months to years following resolution of infection.    Lyme Confirm IgM by CLIA Instrument Value 4.04 (H) <0.90 Index    Lyme Confirm IgM by CLIA Blood Reactive (A) Nonreactive      Comment:      Presumptive detection of anti-Borrelia burgdorferi IgM antibodies.     Narrative    Assay Results should be utilized in conjunction with other clinical and laboratory data to assist the clinician in making individual patient management decisions.        If you have any questions or concerns, please call the clinic at the number listed above.       Sincerely,      Ac Diaz MD

## 2022-07-20 NOTE — DISCHARGE INSTRUCTIONS
Take doxycycline twice daily for 10 days. Take medication with food. You may take ondansetron (zofran) as needed for nausea. [facility] your primary [facility] provider [facility]. Drink plenty of fluids to stay hydrated.     Return to emergency department for new or worsening symptoms.    I hope you feel better soon.

## 2022-07-20 NOTE — ED PROVIDER NOTES
History     Chief Complaint   Patient presents with     Rash     HPI  Darlene Grigsby is a 42 year old female with history bipolar disorder, BPPV, migraine headaches, who presents emergency department for evaluation of rash on her right leg.  Rash began earlier this month.  Over 2 weeks ago.  Started as a single red spots behind her right knee on the medial aspect.  Was warm and tender and started to enlarge.  Was seen at outside facility and started on Augmentin for presumed cellulitis.  Took 2 doses and had horrible GI discomfort.  Discontinued Augmentin and took a 10-day course of sulfamethoxazole trimethoprim.  Rashes continued to grow.  He has been warm to the touch and tender.  No pruritic.  She denies any fevers or chills.  No nausea or vomiting.  While at work earlier today she felt lightheaded and worried that maybe she has a systemic infection.  No chest pain or palpitations.  No cough or shortness of breath.  Has mild intermittent nausea but no vomiting.  No abdominal pain.  No diarrhea.    The patient's PMHx, Surgical Hx, Allergies, and Medications were all reviewed with the patient.    Allergies:  Allergies   Allergen Reactions     Contrast Dye      Diphenhydramine Other (See Comments) and Unknown     agitation     Macrobid [Nitrofurantoin]      Felt like med was giving her seizures, did see flickering lights while on it.       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   10/12/05 LSIL Pap, can't r/o HSIL. (Care Everywhere)  6/9/06 [place] Bx & ECC - Negative, NIL Pap  12/8/06 NIL Pap  7/3/07 NIL Pap  11/20/08 NIL Pap  3/21/12 NIL Pap  3/24/15 NIL Pap  All above from Care Everywhere  12/2/19 NIL Pap, Neg HPV. Plan cotest in 1 year. If AMI 2/3 on biopsy - PAP/HPV co-testing at 12, 24 months. If two negative results  repeat co-testing in 3 years, if negative then routine screening.             Past Medical History:    Past Medical History:   Diagnosis Date     Abnormal Pap smear of cervix 2005       Past Surgical History:    Past Surgical History:   Procedure Laterality Date     LEEP TX, CERVICAL  05/20/2005    See problem list; AMI 3       Family History:    Family History   Problem Relation Age of Onset     Breast Cancer Maternal Grandmother      Breast Cancer Other        Social History:  Marital Status:  Single [1]  Social History     Tobacco Use     Smoking status: Never Smoker     Smokeless tobacco: Never Used   Substance Use Topics     Alcohol use: Yes     Comment: seldom     Drug use: No        Medications:    doxycycline hyclate (VIBRAMYCIN) 100 MG capsule  aspirin-acetaminophen-caffeine (EXCEDRIN MIGRAINE) 250-250-65 MG tablet  cyclobenzaprine (FLEXERIL) 10 MG tablet  guaiFENesin-codeine (ROBITUSSIN AC) 100-10 MG/5ML SOLN  levonorgestrel (MIRENA) 20 MCG/24HR IUD  magnesium oxide (MAG-OX) 400 MG tablet  omega 3 1000 MG CAPS  Dtcgjunpk-QDI-RJ-APAP (TYLENOL COLD PLUS COUGH CHILD PO)  St Johns Wort 1000 MG CAPS  SUMATRIPTAN SUCCINATE PO  SUMAtriptan Succinate Refill (IMITREX) 6 MG/0.5ML SOCT          Review of Systems  A complete review of systems performed and is otherwise negative except as noted in the HPI    Physical Exam   BP: 127/84  Pulse: 95  Temp: 98.7  F (37.1  C)  Resp: 18  Height: 182.9 cm (6')  Weight: 88.5 kg (195 lb)  SpO2: 98 %    Physical Exam  GEN: Awake, alert, and cooperative. No acute distress.  Resting comfortably on cart  HENT: MMM. External ears and nose normal bilaterally.  EYES: EOM intact. Conjunctiva clear. No discharge.   NECK: Symmetric, freely mobile.   CV : Extremities warm and well-perfused  PULM: Normal effort.  Speaking in full sentences no audible wheezing NEURO: Normal speech. Following commands. CN II-XII grossly intact. Answering questions and interacting appropriately.   EXT: No  gross deformity. Warm and well perfused.  INT: Erythematous area on medial portion of right posterior knee with central eschar and no drainage.  No fluctuance palpated.  Minimally tender to palpation.  Erythema is blanchable.  Slight calor.  There is a bull's-eye appearance to the rash.  See photos below.             ED Course        Procedures       Critical Care time:  none               No results found for this or any previous visit (from the past 24 hour(s)).    Medications   doxycycline hyclate (VIBRAMYCIN) capsule 100 mg (has no administration in time range)       Assessments & Plan (with Medical Decision Making)   42 year old female with continued redness and tenderness behind right knee previously treated for cellulitis with 2 days of Augmentin which did not tolerate well and switched to 10 days course of Bactrim which she finished 2 days ago.  No known tick bite.  Photo of rash above which is consistent with erythema chronicum migrans.  Will empirically treat with doxycycline.  Anaplasmosis, babeosis, and Lyme's pending.  We will also obtain CBC and metabolic panel.  Patient does not have any signs of systemic infection.  No tachycardia or fever.  Does not appear distressed. No other rashes noted.     CBC, BMP and LFTs normal.    Patient had nausea episode of vomiting after taking doxycycline.  Unclear how much of doxycycline she got.  Think it is okay for her to wait for tomorrow morning's dose.  She was given a dose of oral ondansetron.  She did take the medication empty stomach.  Advised to take with food.  Also discussed photo sensitivity as well.  Prescription for Zofran also sent to her preferred pharmacy.  Would like her to follow with her primary care provider for test results and if she is continues to not feel well.  Will return to emergency department for new or worsening symptoms.    I have reviewed the nursing notes.         New Prescriptions    DOXYCYCLINE HYCLATE (VIBRAMYCIN) 100 MG CAPSULE     Take 1 capsule (100 mg) by mouth 2 times daily for 10 days       Final diagnoses:   Cellulitis of right lower extremity     Ac Diaz MD    7/20/2022   [facility] EMERGENCY DEPT    Disclaimer: This note consists of words and symbols derived from keyboarding and dictation using voice recognition software.  As a result, there may be errors that have gone undetected.  Please consider this when interpreting information found in this note.             Ac Diaz MD  07/20/22 1924       Ac Diaz MD  07/20/22 1925

## 2022-07-20 NOTE — ED NOTES
Pt c/o cellulitis behind right knee for past 2 weeks.  Pt on abx for past 10 days.  Pt states leg remains red and today felt lightheaded and fatigued and worried the infection is making her sick.  No fevers.

## 2022-07-20 NOTE — ED TRIAGE NOTES
Pt reports right leg staph infection that seems to be getting worse.  Pt was sitting at desk today and felt lightheaded and confused.  Pt c/o leg leg pain today.     Triage Assessment     Row Name 07/20/22 1313       Skin Circulation/Temperature WDL    Skin Circulation/Temperature WDL X

## 2022-07-20 NOTE — LETTER
July 26, 2022      Darlene Grigsby  [address] [facility] [address]        Dear ,    We are writing to inform you of your test results.    A Babesia IgG of <1:16 and IgM of <1:20 is a NEGATIVE result.  (Lyme test)  No treatment or change in treatment per [facility] ED Lab Result Babesiosis protocol..    Resulted Orders   Basic metabolic panel   Result Value Ref Range    Sodium 142 133 - 144 mmol/L    Potassium 4.3 3.4 - 5.3 mmol/L    Chloride 109 94 - 109 mmol/L    Carbon Dioxide (CO2) 29 20 - 32 mmol/L    Anion Gap 4 3 - 14 mmol/L    Urea Nitrogen 14 7 - 30 mg/dL    Creatinine 0.61 0.52 - 1.04 mg/dL    Calcium 8.8 8.5 - 10.1 mg/dL    Glucose 91 70 - 99 mg/dL    GFR Estimate >90 >60 mL/min/1.73m2      Comment:      Effective December 21, 2021 eGFRcr in adults is calculated using the 2021 CKD-EPI creatinine equation which includes age and gender (Harmony et al., [facility], DOI: 10.1056/OXWDdd2911766)   Lyme Disease Total Abs Bld with Reflex to Confirm CLIA   Result Value Ref Range    Lyme Disease Antibodies Total 4.50 (H) <0.90      Comment:      Reactive, Presence of detectable Borrelia burgdorferi antibodies. A chemiluminescent immunoassay for Lyme IgG and Lyme IgM will be performed for confirmation.   Babesia antibody IgG IgM   Result Value Ref Range    Babesia IgG < 1:16 <1:16      Comment:      INTERPRETIVE INFORMATION: Babesia microti Antibody, IgG      Less than 1:16 ........ Negative - No significant level                            of detectable Babesia IgG                            antibodies.    1:16 .................. Equivocal - Repeat testing in                            10-14 days may be helpful.    Greater than 1:16 ..... Positive - IgG antibodies to                            Babesia detected which may                            indicate a current or previous                            infection.    This test was developed and its performance characteristics   determined by [facility]. It has not been cleared or   approved by the US Food and Drug Administration. This test   was performed in a CLIA certified laboratory and is   intended for clinical purposes.    Babesia IgM <1:20 <1:20      Comment:      INTERPRETIVE INFORMATION: Babesia microti Antibody, IgM      Less than 1:20 ........ Negative - No significant level                            of detectable Babesia IgM                            antibodies.    1:20 .................. Equivocal - Repeat testing in                            10-14 days may be helpful.    Greater than 1:20 ..... Positive - IgM antibodies to                            Babesia detected which may                            indicate a current or recent                            infection.    This test was developed and its performance characteristics   determined by [facility]. It has not been cleared or   approved by the US Food and Drug Administration. This test   was performed in a CLIA certified laboratory and is   intended for clinical purposes.  Performed By: [facility]  [address] [place] [id number]  : Hamilton Melgar MD, PhD   Hepatic panel   Result Value Ref Range    Bilirubin Total 0.3 0.2 - 1.3 mg/dL    Bilirubin Direct <0.1 0.0 - 0.2 mg/dL    Protein Total 7.6 6.8 - 8.8 g/dL    Albumin 3.8 3.4 - 5.0 g/dL    Alkaline Phosphatase 56 40 - 150 U/L    AST 12 0 - 45 U/L    ALT 19 0 - 50 U/L   CBC with platelets and differential   Result Value Ref Range    WBC Count 7.6 4.0 - 11.0 10e3/uL    RBC Count 4.22 3.80 - 5.20 10e6/uL    Hemoglobin 12.4 11.7 - 15.7 g/dL    Hematocrit 37.7 35.0 - 47.0 %    MCV 89 78 - 100 fL    MCH 29.4 26.5 - 33.0 pg    MCHC 32.9 31.5 - 36.5 g/dL    RDW 11.6 10.0 - 15.0 %    Platelet Count 254 150 - 450 10e3/uL    % Neutrophils 54 %    % Lymphocytes 35 %    % Monocytes 7 %    % Eosinophils 3 %    % Basophils 1 %    % Immature Granulocytes 0 %    NRBCs per 100 WBC 0 <1 /100     Absolute Neutrophils 4.2 1.6 - 8.3 10e3/uL    Absolute Lymphocytes 2.6 0.8 - 5.3 10e3/uL    Absolute Monocytes 0.5 0.0 - 1.3 10e3/uL    Absolute Eosinophils 0.2 0.0 - 0.7 10e3/uL    Absolute Basophils 0.1 0.0 - 0.2 10e3/uL    Absolute Immature Granulocytes 0.0 <=0.4 10e3/uL    Absolute NRBCs 0.0 10e3/uL   LYME CONFIRM IGG/IGM BY CHEMILUMINESCENT IA BLOOD   Result Value Ref Range    Lyme Confirm IgG by CLIA Instrument Value 22.80 (H) <0.90 Index    Lyme Confirm IgG by CLIA Blood Reactive (A) Nonreactive      Comment:      Detection of specific anti-Borrelia burgdorferi IgG antibodies. IgG-class antibodies may remain detectable for months to years following resolution of infection.    Lyme Confirm IgM by CLIA Instrument Value 4.04 (H) <0.90 Index    Lyme Confirm IgM by CLIA Blood Reactive (A) Nonreactive      Comment:      Presumptive detection of anti-Borrelia burgdorferi IgM antibodies.     Narrative    Assay Results should be utilized in conjunction with other clinical and laboratory data to assist the clinician in making individual patient management decisions.        If you have any questions or concerns, please call the clinic at the number listed above.       Sincerely,      Ac Diaz MD

## 2022-07-21 LAB — B BURGDOR IGG+IGM SER QL: 4.5

## 2022-07-21 NOTE — RESULT ENCOUNTER NOTE
Final result for Lyme Disease Total Abs Bld with Reflex to Confirm CLIA is EQUIVOCAL or POSITIVE.    [facility] Emergency Dept discharge antibiotic prescribed: Doxycycline 100 mg PO tablet, 1 tablet (100 mg) by mouth 2 times daily for 10 days  Await IgG and IgM immunoblot assays per [facility] ED Lab Result Lyme Disease protocol.

## 2022-07-22 LAB
B BURGDOR IGG SERPL QL IA: 22.8 INDEX
B BURGDOR IGG SERPL QL IA: REACTIVE
B BURGDOR IGM SERPL QL IA: 4.04 INDEX
B BURGDOR IGM SERPL QL IA: REACTIVE

## 2022-07-22 NOTE — RESULT ENCOUNTER NOTE
Final Lyme confirm IgG by CLIA is [POSITIVE] and Lyme confirm IgM by CLIA is [POSITIVE].   [facility] Emergency Dept discharge antibiotic prescribed: Doxycycline 100 mg PO tablet, 1 tablet (100 mg) by mouth 2 times daily for 10 days  Recommendations in treatment per [facility] ED lab result Lyme Disease protocol

## 2022-07-23 LAB
B MICROTI IGG TITR SER: NORMAL {TITER}
B MICROTI IGM TITR SER: NORMAL {TITER}

## 2022-07-26 ENCOUNTER — TELEPHONE (OUTPATIENT)
Dept: NURSING | Facility: CLINIC | Age: 42
End: 2022-07-26

## 2022-07-26 NOTE — TELEPHONE ENCOUNTER
Information only note    Caller name: Darlene  Relation to patient: self    Patient calling back for test results.      Patient verbalized her understanding of test results and has no further questions/concerns.       Iesla Pang RN  [facility] Nurse Advisor

## 2023-04-15 ENCOUNTER — HEALTH MAINTENANCE LETTER (OUTPATIENT)
Age: 43
End: 2023-04-15

## 2023-07-05 ENCOUNTER — HOSPITAL ENCOUNTER (EMERGENCY)
Facility: CLINIC | Age: 43
Discharge: HOME | End: 2023-07-05
Attending: EMERGENCY MEDICINE | Admitting: EMERGENCY MEDICINE
Payer: COMMERCIAL

## 2023-07-05 VITALS
DIASTOLIC BLOOD PRESSURE: 75 MMHG | HEART RATE: 85 BPM | WEIGHT: 200 LBS | SYSTOLIC BLOOD PRESSURE: 127 MMHG | HEIGHT: 72 IN | BODY MASS INDEX: 27.09 KG/M2 | RESPIRATION RATE: 18 BRPM | OXYGEN SATURATION: 98 % | TEMPERATURE: 99 F

## 2023-07-05 DIAGNOSIS — R29.898 LEG WEAKNESS, BILATERAL: ICD-10-CM

## 2023-07-05 LAB
ALBUMIN SERPL BCG-MCNC: 4.5 G/DL (ref 3.5–5.2)
ALP SERPL-CCNC: 55 U/L (ref 35–104)
ALT SERPL W P-5'-P-CCNC: 13 U/L (ref 0–50)
ANION GAP SERPL CALCULATED.3IONS-SCNC: 9 MMOL/L (ref 7–15)
AST SERPL W P-5'-P-CCNC: 17 U/L (ref 0–45)
BASOPHILS # BLD AUTO: 0.1 10E3/UL (ref 0–0.2)
BASOPHILS NFR BLD AUTO: 1 %
BILIRUB SERPL-MCNC: 0.3 MG/DL
BUN SERPL-MCNC: 13.1 MG/DL (ref 6–20)
CALCIUM SERPL-MCNC: 9.4 MG/DL (ref 8.6–10)
CHLORIDE SERPL-SCNC: 106 MMOL/L (ref 98–107)
CREAT SERPL-MCNC: 0.66 MG/DL (ref 0.51–0.95)
DEPRECATED HCO3 PLAS-SCNC: 26 MMOL/L (ref 22–29)
EOSINOPHIL # BLD AUTO: 0.2 10E3/UL (ref 0–0.7)
EOSINOPHIL NFR BLD AUTO: 3 %
ERYTHROCYTE [DISTWIDTH] IN BLOOD BY AUTOMATED COUNT: 11.9 % (ref 10–15)
GFR SERPL CREATININE-BSD FRML MDRD: >90 ML/MIN/1.73M2
GLUCOSE SERPL-MCNC: 92 MG/DL (ref 70–99)
HCT VFR BLD AUTO: 37.5 % (ref 35–47)
HGB BLD-MCNC: 12.6 G/DL (ref 11.7–15.7)
IMM GRANULOCYTES # BLD: 0 10E3/UL
IMM GRANULOCYTES NFR BLD: 0 %
LYMPHOCYTES # BLD AUTO: 2.5 10E3/UL (ref 0.8–5.3)
LYMPHOCYTES NFR BLD AUTO: 32 %
MCH RBC QN AUTO: 30.1 PG (ref 26.5–33)
MCHC RBC AUTO-ENTMCNC: 33.6 G/DL (ref 31.5–36.5)
MCV RBC AUTO: 90 FL (ref 78–100)
MONOCYTES # BLD AUTO: 0.6 10E3/UL (ref 0–1.3)
MONOCYTES NFR BLD AUTO: 7 %
NEUTROPHILS # BLD AUTO: 4.4 10E3/UL (ref 1.6–8.3)
NEUTROPHILS NFR BLD AUTO: 57 %
NRBC # BLD AUTO: 0 10E3/UL
NRBC BLD AUTO-RTO: 0 /100
PLATELET # BLD AUTO: 221 10E3/UL (ref 150–450)
POTASSIUM SERPL-SCNC: 4 MMOL/L (ref 3.4–5.3)
PROT SERPL-MCNC: 7.2 G/DL (ref 6.4–8.3)
RBC # BLD AUTO: 4.19 10E6/UL (ref 3.8–5.2)
SODIUM SERPL-SCNC: 141 MMOL/L (ref 136–145)
TSH SERPL DL<=0.005 MIU/L-ACNC: 0.79 UIU/ML (ref 0.3–4.2)
WBC # BLD AUTO: 7.8 10E3/UL (ref 4–11)

## 2023-07-05 PROCEDURE — 36415 COLL VENOUS BLD VENIPUNCTURE: CPT | Performed by: EMERGENCY MEDICINE

## 2023-07-05 PROCEDURE — 84443 ASSAY THYROID STIM HORMONE: CPT | Performed by: EMERGENCY MEDICINE

## 2023-07-05 PROCEDURE — 80053 COMPREHEN METABOLIC PANEL: CPT | Performed by: EMERGENCY MEDICINE

## 2023-07-05 PROCEDURE — 99283 EMERGENCY DEPT VISIT LOW MDM: CPT | Performed by: EMERGENCY MEDICINE

## 2023-07-05 PROCEDURE — 85025 COMPLETE CBC W/AUTO DIFF WBC: CPT | Performed by: EMERGENCY MEDICINE

## 2023-07-05 ASSESSMENT — ACTIVITIES OF DAILY LIVING (ADL)
ADLS_ACUITY_SCORE: 33
ADLS_ACUITY_SCORE: 35

## 2023-07-05 NOTE — ED TRIAGE NOTES
"Pt presents with \"lack of ability to walk\". Pt has hx of leg weakness, with the last episode 3 months ago. Pt has been seen for this but no diagnosis found. Up until the past 3 months, pt was able to be fully active but starting 2 months ago, leg weakness has progressively worsened. Pt has been following up with her neurologist for this as well as seizures. Pt placed on meds for seizures associated with her weakness, which has been effective for seizures but not weakness.   Pt also has body fatigue, where she is unable to do full work, and this morning pt was unable to get out of her car.      Triage Assessment     Row Name 07/05/23 1023       Triage Assessment (Adult)    Airway WDL WDL       Respiratory WDL    Respiratory WDL WDL       Skin Circulation/Temperature WDL    Skin Circulation/Temperature WDL WDL       Cardiac WDL    Cardiac WDL WDL       Peripheral/Neurovascular WDL    Peripheral Neurovascular WDL WDL       Cognitive/Neuro/Behavioral WDL    Cognitive/Neuro/Behavioral WDL WDL              "

## 2023-07-05 NOTE — ED PROVIDER NOTES
History     Chief Complaint   Patient presents with     Extremity Weakness     HPI  Darlene Grigsby is a 43 year old female who presents with complaints of leg weakness x2 months, she describes easy fatigability with flexion extension of the hips, she had similar symptoms in her 20s to fairly discrete episodes.  She reports over the past several days she has become so fatigued that she has difficulty carrying out her tasks at work.  Today she had to leave early and it took her a long time to get from the car to the house and into the bathroom.  She denies fall.  She denies any back pain.  She denies recent febrile illness or preceding URI.  She denies tick bite or rash.  She denies change in bowel or bladder.  She does describe some word slurring that has been infrequent and short lived, also some head bobbing and twitching of her hands intermittently at times.  She saw neurology Dr. Shea    Allergies:  Allergies   Allergen Reactions     Contrast Dye      Diphenhydramine Other (See Comments) and Unknown     agitation     Macrobid [Nitrofurantoin]      Felt like med was giving her seizures, did see flickering lights while on it.       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   10/12/05 LSIL Pap, can't r/o HSIL. (Care Everywhere)  6/9/06 [place] Bx & ECC - Negative, NIL Pap  12/8/06 NIL Pap  7/3/07 NIL Pap  11/20/08 NIL Pap  3/21/12 NIL Pap  3/24/15 NIL Pap  All above from Care Everywhere  12/2/19 NIL Pap, Neg HPV. Plan cotest in 1 year. If AMI 2/3 on biopsy - PAP/HPV co-testing at 12, 24 months. If two negative results repeat co-testing in 3 years, if negative then routine screening.             Past Medical History:    Past Medical History:   Diagnosis Date     Abnormal Pap smear of cervix 2005       Past Surgical History:     Past Surgical History:   Procedure Laterality Date     LEEP TX, CERVICAL  05/20/2005    See problem list; AMI 3       Family History:    Family History   Problem Relation Age of Onset     Breast Cancer Maternal Grandmother      Breast Cancer Other        Social History:  Marital Status:  Single [1]  Social History     Tobacco Use     Smoking status: Never     Smokeless tobacco: Never   Substance Use Topics     Alcohol use: Yes     Comment: seldom     Drug use: No        Medications:    aspirin-acetaminophen-caffeine (EXCEDRIN MIGRAINE) 250-250-65 MG tablet  cyclobenzaprine (FLEXERIL) 10 MG tablet  guaiFENesin-codeine (ROBITUSSIN AC) 100-10 MG/5ML SOLN  levonorgestrel (MIRENA) 20 MCG/24HR IUD  magnesium oxide (MAG-OX) 400 MG tablet  omega 3 1000 MG CAPS  ondansetron (ZOFRAN ODT) 4 MG ODT tab  Oduwrkpko-JPK-AG-APAP (TYLENOL COLD PLUS COUGH CHILD PO)  St Johns Wort 1000 MG CAPS  SUMATRIPTAN SUCCINATE PO  SUMAtriptan Succinate Refill (IMITREX) 6 MG/0.5ML SOCT          Review of Systems    Physical Exam   BP: 127/75  Pulse: 85  Temp: 99  F (37.2  C)  Resp: 18  Height: 182.9 cm (6')  Weight: 90.7 kg (200 lb)  SpO2: 98 %      Physical Exam  GENERAL Nontoxic-appearing no respiratory distress alert and oriented x3. GCS 15 on arrival, throughout stay and at discharge.    HEENT Head atraumatic normocephalic     PERRL, EOMI, conjunctiva clear, sclera nonicteric, no discharge, no periorbital swelling or redness     Oropharynx moist without lesions, erythema or exudate.  Tongue is not swollen.     Nose is unremarkable to inspection, mucous membranes are moist and pink, no nasal discharge or bleeding    MUSCULOSKELETAL neck supple full active painless range of motion no posterior midline tenderness.      Spine nontender to palpation    Pelvis stable nontender    Chest nontender    No outward sign of trauma to the chest back or abdomen    Extremities are atraumatic, full painless active range of motion all joints    PULMONARY  lungs are clear to auscultation, breath sounds are symmetrical, bilateral chest rise, no rales rhonchi or wheezes appreciated    CARDIOVASCULAR heart is regular no murmur appreciated.  Peripheral pulses are symmetrical and strong.  Capillary refill is normal.     GASTROINTESTINAL abdomen is soft, nondistended, bowel sounds positive, no mass appreciated, no guarding or rebound    NEUROLOGICAL Cranial nerves; vision baseline fields intact, PERRL, EOMI, facial sensation intact to light touch, facial muscle tone intact and symmetrical, hearing grossly intact,swallowing without difficulty, SCM strength intact, tongue protrudes midline, shoulder shrug intact      Strength is 5/5 throughout all muscle groups of the extremities with exception of the hip flexors which show 3/5 strength bilaterally     Sensation intact to light touch     There is no ataxia     DTRs at biceps, triceps and brachioradialis 1/4 and symmetrical DTRs at knees 2/4 DTRs at ankles 1/4    SKIN skin is clear from rash, pink warm and dry    PSYCHIATRIC patient is alert, attentive, good eye contact, well kempt, thought processes are rational and organized, affect is normal, mood is neutral, patient is not agitated, no delusions, able to answer questions appropriately    ED Course                 Procedures             Results for orders placed or performed during the hospital encounter of 07/05/23 (from the past 24 hour(s))   CBC with platelets differential    Narrative    The following orders were created for panel order CBC with platelets differential.  Procedure                               Abnormality         Status                     ---------                               -----------         ------                     CBC with platelets and d...[[id number]]                      Final result                 Please view results for these tests on the individual orders.   Comprehensive metabolic panel   Result Value Ref Range    Sodium 141 136 - 145  mmol/L    Potassium 4.0 3.4 - 5.3 mmol/L    Chloride 106 98 - 107 mmol/L    Carbon Dioxide (CO2) 26 22 - 29 mmol/L    Anion Gap 9 7 - 15 mmol/L    Urea Nitrogen 13.1 6.0 - 20.0 mg/dL    Creatinine 0.66 0.51 - 0.95 mg/dL    Calcium 9.4 8.6 - 10.0 mg/dL    Glucose 92 70 - 99 mg/dL    Alkaline Phosphatase 55 35 - 104 U/L    AST 17 0 - 45 U/L    ALT 13 0 - 50 U/L    Protein Total 7.2 6.4 - 8.3 g/dL    Albumin 4.5 3.5 - 5.2 g/dL    Bilirubin Total 0.3 <=1.2 mg/dL    GFR Estimate >90 >60 mL/min/1.73m2   CBC with platelets and differential   Result Value Ref Range    WBC Count 7.8 4.0 - 11.0 10e3/uL    RBC Count 4.19 3.80 - 5.20 10e6/uL    Hemoglobin 12.6 11.7 - 15.7 g/dL    Hematocrit 37.5 35.0 - 47.0 %    MCV 90 78 - 100 fL    MCH 30.1 26.5 - 33.0 pg    MCHC 33.6 31.5 - 36.5 g/dL    RDW 11.9 10.0 - 15.0 %    Platelet Count 221 150 - 450 10e3/uL    % Neutrophils 57 %    % Lymphocytes 32 %    % Monocytes 7 %    % Eosinophils 3 %    % Basophils 1 %    % Immature Granulocytes 0 %    NRBCs per 100 WBC 0 <1 /100    Absolute Neutrophils 4.4 1.6 - 8.3 10e3/uL    Absolute Lymphocytes 2.5 0.8 - 5.3 10e3/uL    Absolute Monocytes 0.6 0.0 - 1.3 10e3/uL    Absolute Eosinophils 0.2 0.0 - 0.7 10e3/uL    Absolute Basophils 0.1 0.0 - 0.2 10e3/uL    Absolute Immature Granulocytes 0.0 <=0.4 10e3/uL    Absolute NRBCs 0.0 10e3/uL       Medications - No data to display    Assessments & Plan (with Medical Decision Making)  Patient with lower extremity proximal muscle weakness and easy fatigability with similar symptoms in her 20s presents for evaluation.  Difficulty accomplishing ADLs.  Broad differential considered including but not limited to metabolic derangement, infectious process, CNS demyelination versus other.  Patient is following with neurology, I spoke with neurologist on-call for their group.  He reviewed previous records including distant records from 20 years ago, work-up was unrevealing including a visit to the [facility].  He  and I did not feel extensive work-up was indicated today in the emergency department, she has appointment to follow-up with her neurologist in a couple weeks.  Recommend calling their office to try and FasTRACT that appointment.  Discussed transfer to Center with neurology versus going home taking some time and resting and subsequently presenting to facility with neurology available.  She opted for discharge to home, off work for the next week, follow-up with her neurologist, otherwise present to Center with neurology available for work-up and possible admission.     I have reviewed the nursing notes.    I have reviewed the findings, diagnosis, plan and need for follow up with the patient.        Discharge Medication List as of 7/5/2023  2:04 PM          Final diagnoses:   Leg weakness, bilateral       7/5/2023   [facility] EMERGENCY DEPT     Francois Pepper MD  07/05/23 [id number]

## 2023-07-05 NOTE — LETTER
July 5, 2023      To Whom It May Concern:      Darlene Grigsby was seen in our Emergency Department today, 07/05/23.    Please excuse from work for the next 1 week.    Sincerely,        Francois Pepper MD

## 2023-07-05 NOTE — DISCHARGE INSTRUCTIONS
Activity as tolerated    Go to emergency department at [facility] or [facility] if symptoms persist or worsen    Contact neurology and attempt to obtain earlier appointment    Off work for the next week

## 2023-10-17 ENCOUNTER — VIRTUAL VISIT (OUTPATIENT)
Dept: FAMILY MEDICINE | Facility: CLINIC | Age: 43
End: 2023-10-17
Payer: MEDICAID

## 2023-10-17 DIAGNOSIS — M62.81 GENERALIZED MUSCLE WEAKNESS: Primary | ICD-10-CM

## 2023-10-17 DIAGNOSIS — M62.838 MUSCLE SPASM: ICD-10-CM

## 2023-10-17 PROBLEM — G43.009 MIGRAINE WITHOUT AURA AND WITHOUT STATUS MIGRAINOSUS, NOT INTRACTABLE: Status: ACTIVE | Noted: 2022-08-04

## 2023-10-17 PROCEDURE — 99212 OFFICE O/P EST SF 10 MIN: CPT | Mod: 95 | Performed by: PHYSICIAN ASSISTANT

## 2023-10-17 RX ORDER — SODIUM PHOSPHATE,MONO-DIBASIC 19G-7G/118
500 ENEMA (ML) RECTAL 3 TIMES DAILY
COMMUNITY

## 2023-10-17 RX ORDER — CARISOPRODOL 350 MG/1
350 TABLET ORAL 4 TIMES DAILY PRN
Qty: 30 TABLET | Refills: 1 | Status: SHIPPED | OUTPATIENT
Start: 2023-10-17

## 2023-10-17 RX ORDER — GABAPENTIN 300 MG/1
300 CAPSULE ORAL 4 TIMES DAILY
COMMUNITY
End: 2024-03-01

## 2023-10-17 RX ORDER — DULOXETIN HYDROCHLORIDE 30 MG/1
30 CAPSULE, DELAYED RELEASE ORAL DAILY
COMMUNITY
Start: 2023-08-18

## 2023-10-17 ASSESSMENT — ENCOUNTER SYMPTOMS
SHORTNESS OF BREATH: 0
SEIZURES: 1
FEVER: 0
NERVOUS/ANXIOUS: 0
TREMORS: 0
GASTROINTESTINAL NEGATIVE: 1
WHEEZING: 0
SPEECH DIFFICULTY: 0
FATIGUE: 0
DECREASED CONCENTRATION: 0
PALPITATIONS: 0
DIZZINESS: 0
COUGH: 0
LIGHT-HEADEDNESS: 0
FACIAL ASYMMETRY: 0
CHILLS: 0
WEAKNESS: 1

## 2023-10-17 NOTE — COMMUNITY RESOURCES LIST (ENGLISH)
10/17/2023   [facility] Sl[facility]  N/A  For questions about this resource list or additional care needs, please contact your primary care clinic or care manager.  Phone: [phone]   Email: N/A   Address: [address] [place] [address]   Hours: N/A        Financial Stability       Rent and mortgage payment assistance  1  Community Helping Hand Distance: 12.22 miles      In-Person, Phone/Virtual   [address] [place] [address]  Language: English  Hours: Mon - Sun Appt. Only  Fees: Free   Phone: (293) 728-2460 Email: johnson@[employer].com.SheZoom Website: http://www.communityGenesis Hospitalpinghand.org     2  [employer] [place] ([employer]) - Emergency Assistance - Rent and mortgage payment assistance Distance: 24.89 miles      In-Person   [address] [place] [address]  Language: English, German  Hours: Mon 10:00 AM - 1:30 PM Appt. Only, Wed 10:00 AM - 1:30 PM Appt. Only, Thu 4:30 PM - 6:30 PM Appt. Only, Fri 10:00 AM - 1:30 PM Appt. Only  Fees: Free   Phone: (244) 498-4187 Email: info@[employer].org Website: http://www.caerVarian Semiconductor Equipment Associatesf.org          Food and Nutrition       Food pantry  3  [facility] Pantry Distance: 6.56 miles      In-Person   [address] [place] [address]  Language: English  Hours: Sat 8:30 AM - 11:30 AM  Fees: Free   Phone: (918) 332-9271 Email: contact@[employer].org Website: http://[employer].org/     4  Family Pathways Food [facility] Distance: 7.76 miles      Pickup   [address] [place] [address]  Language: English  Hours: Mon 9:00 AM - 5:00 PM , Wed 9:00 AM - 5:00 PM , Fri 9:00 AM - 5:00 PM  Fees: Free   Phone: (155) 397-3392 Email: mail@[employer].org Website: https://www.familypathways.org/our-work/food-access-and-equity/     SNAP application assistance  5  [place] [employer] [place] [facility] - [place] Office Distance: 7.74 miles      In-Person, Phone/Virtual   [address] [place] [address]  Language: English  Hours: Mon - Fri 8:00 AM - 4:30 PM  Fees: Free   Phone: (777) 820-4345 Email: filemon@[facility] HospitalCornicesVisTracks Website: https://www.[facility] HospitalVisTracks/agency-information     6  [facility] Distance: 10.55 miles      In-Person, Phone/Virtual   [address] [place] [address]  Language: English  Hours: Mon - Fri 8:00 AM - 4:30 PM  Fees: Free   Phone: (351) 617-5684 Email: jorge@[facility] Website: https://www.[facility]./170/Family-Services     Soup kitchen or free meals  7  [facility] - Family Table Meals - Family Table Meal Distance: 20.71 miles      Pickup   [address] [place] [address]  Language: English  Hours: Thu 5:00 PM - 6:30 PM  Fees: Free   Phone: (992) 965-3681 Email: Thorne Holding@[facility] CountyVisTracks Website: http://www.[facility].HotelQuickly     8  [facility] - Family Table Meal Distance: 21.12 miles      In-Person, Pickup   [address] [place] [address]  Language: English  Hours: Thu 5:30 PM - 6:30 PM  Fees: Free   Phone: (602) 840-8997 Email: office@[employer].org Website: http://www.[employer].org          Important Numbers & Websites       Emergency Services   911  [facility] Services   311  Poison Control   (302) 277-9629  Suicide Prevention Lifeline   (973) 886-8597 (TALK)  Child Abuse Hotline   (333) 419-9987 (4-A-Child)  Sexual Assault Hotline   (432) 411-2960 (HOPE)  National Runaway Safeline   (256) 811-4557 (RUNAWAY)  All-Options Talkline   (206) 182-3535  Substance Abuse Referral   (101) 334-7915 (HELP)

## 2023-10-17 NOTE — PROGRESS NOTES
Darlene is a 43 year old who is being evaluated via a billable video visit.      How would you like to obtain your AVS? MyChart  If the video visit is dropped, the invitation should be resent by: Text to cell phone: [phone]  Will anyone else be joining your video visit? No          Assessment & Plan   Generalized muscle weakness  Muscle spasm  Generalized weakness, muscle spasms of lower extremities and history of nonepileptic seizures starting in her 20s.  She has had 3-4 episodes of this since her 20s.  She has had extensive work-up including [facility].  Recently saw her neurologist a month and a half ago at [facility] who could not find any definitive answers why she continues to have weakness to her lower extremities.  She was using a wheelchair for some time as this causes significant weakness.  She denies any other associated symptoms but she does have a history of epilepsy and being controlled with gabapentin.  She was on Flexeril but this was not helpful we will trial Soma to see if this helps.  She is to take this at bedtime.  Side effects of the medication were discussed.  I think it would be best to have her see a neuromuscular neurologist for further evaluation.  Referral has been placed.  She is to closely monitor her symptoms.  She is to report back to the emergency department if she has new or worsening symptoms.  She is also needing help for unemployment as she is not able to work.  She will get me paperwork and I will get this filled out.  - Adult Neurology  Referral; Future  - carisoprodol (SOMA) 350 MG tablet; Take 1 tablet (350 mg) by mouth 4 times daily as needed for muscle spasms    ER note reviewed x1, labs reviewed x5 MRI x 4    [facility]   Darlene is a 43 year old, presenting for the following health issues:  Consult (Pt reports she was seen by neuro about 1-1.5 months ago. Pt reports she had  non epilectic seizures in her 20s, got better but now came back.)        10/17/2023    12:42 PM   Additional Questions   Roomed by Suad Parker   Accompanied by rissa       Darlene is a 43-year-old female that presents via video visit to discuss generalized lower extremity weakness, muscle spasm, and seizures.  She states that she was diagnosed with nonepileptic seizures in her early 20s.  She states that she was at a friend's house and was feeding her for she went to turn around and walked back to her bedroom and she had sudden loss of motor weakness to her lower extremities requiring being in a wheelchair for a period of time.  She has only had 3-4 episodes of this since her 20s.  She was evaluated by [facility] who could not find a definitive answer why she has lower extremity weakness.  She was diagnosed with nonepileptic seizures and was started on Flexeril and gabapentin for the muscle spasms.  She states that the gabapentin has helped with the spasming but she continues to have weakness.  She did present to the emergency department on 7/8/2023 as she had a severe episode of weakness and spasming to her lower extremities.  She states that it typically affects the left side greater than the right.  She did follow-up with neurology about a month ago and did an extensive work-up.  Unfortunately results are not available.  She states that all of her work-up with neurology was unremarkable.  They recommended following up with psychiatry.  She does have a history of Lyme disease and has been doing acupuncture which she feels has helped.  Since seeing [facility] her lower extremity weakness has improved but she is still fearful that she will have another episode of extreme weakness.    History of Present Illness       Reason for visit:  Unemployment/Medical Insurance Help  Symptom onset:  More than a month  Symptoms include:  Leg Weakness/Seizures  Symptom intensity:  Moderate  Symptom  progression:  Improving  Had these symptoms before:  Yes  Has tried/received treatment for these symptoms:  Yes  Previous treatment was successful:  No  What makes it worse:  Walking, Stress  What makes it better:  Rest    She eats 2-3 servings of fruits and vegetables daily.She consumes 1 sweetened beverage(s) daily.She exercises with enough effort to increase her heart rate 9 or less minutes per day.  She exercises with enough effort to increase her heart rate 3 or less days per week.   She is taking medications regularly.         Review of Systems   Constitutional:  Negative for chills, fatigue and fever.   Respiratory:  Negative for cough, shortness of breath and wheezing.    Cardiovascular:  Negative for chest pain and palpitations.   Gastrointestinal: Negative.    Skin: Negative.    Neurological:  Positive for seizures and weakness. Negative for dizziness, tremors, facial asymmetry, speech difficulty and light-headedness.   Psychiatric/Behavioral:  Negative for decreased concentration and mood changes. The patient is not nervous/anxious.             Objective           Vitals:  No vitals were obtained today due to virtual visit.    Physical Exam   GENERAL: Healthy, alert and no distress  EYES: Eyes grossly normal to inspection.  No discharge or erythema, or obvious scleral/conjunctival abnormalities.  RESP: No audible wheeze, cough, or visible cyanosis.  No visible retractions or increased work of breathing.    SKIN: Visible skin clear. No significant rash, abnormal pigmentation or lesions.  NEURO: Cranial nerves grossly intact.  Mentation and speech appropriate for age.  PSYCH: Mentation appears normal, affect normal/bright, judgement and insight intact, normal speech and appearance well-groomed.            Video-Visit Details    Type of service:  Video Visit   Video Start Time: 1:00 PM  Video End Time: 1:22 PM    Originating Location (pt. Location): Home    Distant Location (provider location):   On-site  Platform used for Video Visit: Kristie

## 2024-01-04 ENCOUNTER — APPOINTMENT (OUTPATIENT)
Dept: GENERAL RADIOLOGY | Facility: CLINIC | Age: 44
End: 2024-01-04
Attending: FAMILY MEDICINE
Payer: MEDICAID

## 2024-01-04 ENCOUNTER — HOSPITAL ENCOUNTER (EMERGENCY)
Facility: CLINIC | Age: 44
Discharge: HOME | End: 2024-01-04
Attending: FAMILY MEDICINE | Admitting: FAMILY MEDICINE
Payer: MEDICAID

## 2024-01-04 VITALS
HEIGHT: 72 IN | BODY MASS INDEX: 29.12 KG/M2 | TEMPERATURE: 97.8 F | OXYGEN SATURATION: 99 % | SYSTOLIC BLOOD PRESSURE: 137 MMHG | WEIGHT: 215 LBS | HEART RATE: 85 BPM | DIASTOLIC BLOOD PRESSURE: 84 MMHG

## 2024-01-04 DIAGNOSIS — S93.601A FOOT SPRAIN, RIGHT, INITIAL ENCOUNTER: ICD-10-CM

## 2024-01-04 PROCEDURE — 99283 EMERGENCY DEPT VISIT LOW MDM: CPT | Performed by: FAMILY MEDICINE

## 2024-01-04 PROCEDURE — 73630 X-RAY EXAM OF FOOT: CPT | Mod: RT

## 2024-01-04 ASSESSMENT — ACTIVITIES OF DAILY LIVING (ADL): ADLS_ACUITY_SCORE: 35

## 2024-01-04 NOTE — DISCHARGE INSTRUCTIONS
RETURN TO THE EMERGENCY ROOM FOR THE FOLLOWING:    Severely worsened pain, expanding redness/swelling/tenderness, fever greater than 101, or at anytime for any concern.    FOLLOW UP:    With your primary clinic if not improved over the next 2 weeks.    TREATMENT RECOMMENDATIONS:    Ibuprofen alternating with Tylenol as needed for comfort.  Avoid crutches and the walking boot if able.  Advance weightbearing and ambulation as able.    NURSE ADVICE LINE:  [phone] or [phone]

## 2024-01-04 NOTE — ED TRIAGE NOTES
Pt hurt right foot 1 week ago after fall.  Pt walking on foot.     Triage Assessment (Adult)       Row Name 01/04/24 1110          Triage Assessment    Airway WDL WDL        Respiratory WDL    Respiratory WDL WDL        Skin Circulation/Temperature WDL    Skin Circulation/Temperature WDL WDL        Cardiac WDL    Cardiac WDL WDL        Peripheral/Neurovascular WDL    Peripheral Neurovascular WDL WDL        Cognitive/Neuro/Behavioral WDL    Cognitive/Neuro/Behavioral WDL WDL

## 2024-01-04 NOTE — ED PROVIDER NOTES
"  HPI   The patient is a 43-year-old female presenting with right foot pain.  On Sunday, 4 days ago, the patient fell down some stairs.  She describes the right foot as being \"stuck on something and pulled back on me as I fell.\"  She had obvious swelling and pain with tenderness over the right proximal and lateral foot.  She has had difficulty bearing weight since the fall.  She does describe improved symptoms.  No ankle pain.  No proximal leg or knee pain.  No other injury.  No prior fractures or surgical procedures involving the foot.      Allergies:  Allergies   Allergen Reactions    Contrast Dye     Diphenhydramine Other (See Comments) and Unknown     agitation    Macrobid [Nitrofurantoin]      Felt like med was giving her seizures, did see flickering lights while on it.     Problem List:    Patient Active Problem List    Diagnosis Date Noted    Migraine without aura and without status migrainosus, not intractable 08/04/2022     Priority: Medium    IUD (intrauterine device) in place 03/24/2015     Priority: Medium     Formatting of this note might be different from the original. 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   10/12/05 LSIL Pap, can't r/o HSIL. (Care Everywhere)  6/9/06 [place] Bx & ECC - Negative, NIL Pap  12/8/06 NIL Pap  7/3/07 NIL Pap  11/20/08 NIL Pap  3/21/12 NIL Pap  3/24/15 NIL Pap  All above from Care Everywhere  12/2/19 NIL Pap, Neg HPV. Plan cotest in 1 year. If AMI 2/3 on biopsy - PAP/HPV co-testing at 12, 24 months. If two negative results repeat co-testing in 3 years, if negative then routine screening.         Premenstrual tension syndrome " 10/08/2005     Priority: Medium     Formatting of this note might be different from the original. Epic      Bipolar disorder (H) 05/03/2005     Priority: Medium     Formatting of this note might be different from the original. [facility]        Past Medical History:    Past Medical History:   Diagnosis Date    Abnormal Pap smear of cervix 2005     Past Surgical History:    Past Surgical History:   Procedure Laterality Date    LEEP TX, CERVICAL  05/20/2005    See problem list; AMI 3     Family History:    Family History   Problem Relation Age of Onset    Breast Cancer Maternal Grandmother     Breast Cancer Other      Social History:  Marital Status:  Single [1]  Social History     Tobacco Use    Smoking status: Never     Passive exposure: Never    Smokeless tobacco: Never   Vaping Use    Vaping Use: Never used   Substance Use Topics    Alcohol use: Yes     Comment: seldom    Drug use: No      Medications:    aspirin-acetaminophen-caffeine (EXCEDRIN MIGRAINE) 250-250-65 MG tablet  carisoprodol (SOMA) 350 MG tablet  DULoxetine (CYMBALTA) 30 MG capsule  gabapentin (NEURONTIN) 300 MG capsule  glucosamine 500 MG CAPS capsule  levonorgestrel (MIRENA) 20 MCG/24HR IUD  magnesium oxide (MAG-OX) 400 MG tablet  omega 3 1000 MG CAPS  St Johns Wort 1000 MG CAPS  SUMAtriptan Succinate Refill (IMITREX) 6 MG/0.5ML SOCT      Review of Systems   All other systems reviewed and are negative.      PE   BP: 137/84  Pulse: 85  Temp: 97.8  F (36.6  C)  Height: 182.9 cm (6')  Weight: 97.5 kg (215 lb)  SpO2: 99 %  Physical Exam  Vitals reviewed.   Constitutional:       General: She is not in acute distress.     Appearance: She is well-developed.   HENT:      Head: Normocephalic and atraumatic.      Right Ear: External ear normal.      Left Ear: External ear normal.      Nose: Nose normal.      Mouth/Throat:      Mouth: Mucous membranes are moist.      Pharynx: Oropharynx is clear.   Eyes:      Extraocular Movements: Extraocular movements intact.       Conjunctiva/sclera: Conjunctivae normal.      Pupils: Pupils are equal, round, and reactive to light.   Cardiovascular:      Rate and Rhythm: Normal rate and regular rhythm.   Pulmonary:      Effort: Pulmonary effort is normal.   Musculoskeletal:         General: Normal range of motion.      Cervical back: Normal range of motion.      Comments: There is mild swelling over the right lateral foot.  This is tender.  No deformity.  No overlying skin changes.   Skin:     General: Skin is warm and dry.   Neurological:      Mental Status: She is alert and oriented to person, place, and time.   Psychiatric:         Behavior: Behavior normal.         ED COURSE and [facility]   1312.  Patient has symptoms and signs as described above.  X-ray of the right foot is pending.    1412.  X-ray unremarkable.  Foot sprain likely.  Advance weightbearing and ambulation as able.    Electronic medical chart reviewed, including medical problems, medications, medical allergies, social history.  Recent hospitalizations and surgical procedures reviewed.  Recent clinic visits and consultations reviewed.  Recent labs and test results reviewed.  Nursing notes reviewed.    The patient, their parent if applicable, and/or their medical decision maker(s) and I have reviewed all of the available historical information, applicable PMH, physical exam findings, and objective diagnostic data gathered during this ED visit.  We then discussed all work-up options and then together agreed upon the course taken during this visit.  The ultimate disposition and plan was a cooperative decision made between myself and the patient, their parent if applicable, and/or their legal decision maker(s).  The risks and benefits of all decisions made during this visit were discussed to the best of my abilities given the circumstances, and all parties are understanding of the pertinent ramifications of these decisions.      LABS  Labs Ordered and Resulted from Time of ED Arrival  to Time of ED Departure - No data to display    IMAGING  Images reviewed by me.  Radiology report also reviewed.  Foot  XR, G/E 3 views, right   Final Result   Impression:      1.  Normal joint alignment and spacing. No fracture, erosion, or   concerning bone lesion. Normal soft tissues.      NOBLE TOVAR MD            SYSTEM ID:  [id number]          Procedures    Medications - No data to display      IMPRESSION       ICD-10-CM    1. Foot sprain, right, initial encounter  S93.601A                Medication List      There are no discharge medications for this visit.                       Rodríguez Barron MD  01/04/24 7701

## 2024-01-31 ENCOUNTER — TELEPHONE (OUTPATIENT)
Dept: FAMILY MEDICINE | Facility: CLINIC | Age: 44
End: 2024-01-31

## 2024-01-31 NOTE — TELEPHONE ENCOUNTER
Prior Authorization Request  Who s requesting:  cover my meds; pharm  Pharmacy Name and Location: [id number] - [address] AT [facility] [place] [address] [place] [address]   Medication Name:     carisoprodol (SOMA) 350 MG tablet     Insurance Plan: vesta ABBOTT  Insurance Member ID Number:  [id number]   CoverMyMeds Key: [id number]

## 2024-02-13 NOTE — TELEPHONE ENCOUNTER
Retail Pharmacy Prior Authorization Team   Phone: [phone]    PA Initiation    Medication: CARISOPRODOL 350 MG PO TABS  Insurance Company: [place] Medicaid ([facility]) - Phone [phone] Fax [phone]  Pharmacy Filling the Rx: [facility] #[id number] - [place], [place] - [address] [place], [place] [address] AT [address]  Filling Pharmacy Phone: [phone]  Filling Pharmacy Fax:    Start Date: 2/13/2024

## 2024-02-14 NOTE — TELEPHONE ENCOUNTER
SENT UPDATED [place] FORM TO [place] MEDICAID TODAY - FILLED OUT PHARMACY INFO PER REQUEST.       Retail Pharmacy Prior Authorization Team   Phone: [phone]

## 2024-02-16 NOTE — TELEPHONE ENCOUNTER
REC'D FAX STATING THAT PT'S COVERAGE WITH [place] MEDICAID STATING THAT COVERAGE IS TERMED?     PER [place] LOOKS [place]IVE STILL.     TRIED CALLING [place] MEDICAID BUT NO ANSWER AFTER 20 MINTES.     LEFT VM FOR PT - MAYBE SHE CAN TRY CALLING [place] MEDICAID AS WELL. I WILL TRY CALLING THEM AGAIN ON MONDAY.

## 2024-02-23 NOTE — TELEPHONE ENCOUNTER
Per phonecall to [place] Medicaid - pt has active pharmacy coverage and no other primary insurance on file. Faxed form to them again today through [facility]. Check status on Monday.

## 2024-02-27 NOTE — TELEPHONE ENCOUNTER
Retail Pharmacy Prior Authorization Team   Phone: [phone]    PRIOR AUTHORIZATION DENIED    Medication: CARISOPRODOL 350 MG PO TABS  Insurance Company: [place] Medicaid ([facility]) - Phone [phone] Fax [phone]  Denial Date:  02/23/2024  Denial Reason(s): Insurance states that patient does not meet coverage criteria.    Appeal Information:   Patient Notified: NO

## 2024-02-29 PROBLEM — R29.898 BILATERAL LEG WEAKNESS: Status: ACTIVE | Noted: 2023-07-05

## 2024-02-29 PROBLEM — Z86.69 HISTORY OF MIGRAINE: Status: ACTIVE | Noted: 2023-07-05

## 2024-02-29 NOTE — PROGRESS NOTES
NEUROLOGY CONSULTATION NOTE       [facility] NEUROLOGY [place]  [address] [place] [address]  Tel: [phone]  Fax: [phone]  [email or web address]     Darlene Grigsby,  1980, MRN [id number]  PCP: Fabrizio Abel  Date: 2024     ASSESSMENT & PLAN     Visit Diagnosis  Functional neurologic complaint     Generalized muscle weakness  43-year-old female with history of bipolar disorder, migraine headache without aura who was referred for second opinion of progressive weakness in the legs since May 2022.  She had an extensive workup at [facility], [facility], [facility] and [facility] that included MRI brain, complete spine, 48-hour EEG, EMG and lab work that were normal.  She was diagnosed with functional neurological disorder and psychiatry evaluation was recommended.  However she did not feel her symptoms were related to depression/anxiety and wanted a second opinion.  I have informed her that she already had an extensive workup and I would not recommend repeating any more test except for lab work to include acetylcholine receptor antibodies, NATALIE, CK, lactate, pyruvate and Lyme titer.  If these tests are normal, her symptoms likely will be due to conversion disorder and I have strongly encouraged her to see a psychiatrist and a psychologist to better address her underlying mental health issues.  Follow-up will be on as needed basis.    Thank you again for this referral, please feel free to contact me if you have any questions.    Kelvin Salazar MD  [facility]     REASON FOR CONSULTATION Generalized Weakness        HISTORY OF PRESENT ILLNESS     We have been requested by Fabrizio Abel to evaluate Darlene Grigsby who is a 43 year old  female for weakness    Patient is a 43-year-old female with a history of bipolar disorder, BPPV, migraine headache who was referred for a second opinion of progressive weakness in the legs  since May 2022.  According to patient she is unable to walk more than 200 feet and has been using a wheelchair at home due to severe symptoms she was admitted to the hospital in May 2023 and had reported intermittent twitches but her EEG and 48-hour EEG were normal.  MRI brain was also within normal limits.  The symptoms started after she started a full-time job and was treated with gabapentin.  Subsequently she was admitted to [facility] facility for similar symptoms and they felt patient had  functional weakness.  Apparently years ago she was evaluated at [facility]) for head bobbing and stuttering speech & had a  negative workup.  In July 2023 she had MRI of the brain and complete spine that was normal.  Lab work included normal Lyme titer, Babesia antibody, HIV, hepatitis B surface antigen, B12.  EMG was also done in July 2023 that was normal.  She was diagnosed with functional neurological symptoms and was encouraged to follow-up with a psychiatrist.  However she is not convinced that her symptoms are psychological and has not seen a psychiatrist and only sees a psychologist.  She feels her weakness got worse after she was exposed to COVID-19 and is worried that she might not be able to continue working due to her symptoms     PROBLEM LIST   Patient Active Problem List   Diagnosis Code    Dysplasia of cervix N87.9    Bipolar disorder (H) F31.9    BPPV (benign paroxysmal positional vertigo) H81.10    IUD (intrauterine device) in place Z97.5    Migraine without aura and without status migrainosus, not intractable G43.009    Premenstrual tension syndrome N94.3    Bilateral leg weakness R29.898         PAST MEDICAL & SURGICAL HISTORY     Past Medical History:   Patient  has a past medical history of Abnormal Pap smear of cervix (2005).    Surgical History:  She  has a past surgical history that includes leep tx, cervical (05/20/2005).     SOCIAL HISTORY     Reviewed, and she  reports that she has never smoked. She has  never been exposed to tobacco smoke. She has never used smokeless tobacco. She reports current alcohol use. She reports that she does not use drugs.     FAMILY HISTORY     Reviewed, and family history includes Breast Cancer in her maternal grandmother and another family member; Fibromyalgia in her mother.     ALLERGIES     Allergies   Allergen Reactions    Contrast Dye     Diphenhydramine Other (See Comments) and Unknown     agitation    Macrobid [Nitrofurantoin]      Felt like med was giving her seizures, did see flickering lights while on it.         REVIEW OF SYSTEMS     A 12 point review of system was performed and was negative except as outlined in the history of present illness.     HOME MEDICATIONS     Current Outpatient Rx   Medication Sig Dispense Refill    aspirin-acetaminophen-caffeine (EXCEDRIN MIGRAINE) 250-250-65 MG tablet       carisoprodol (SOMA) 350 MG tablet Take 1 tablet (350 mg) by mouth 4 times daily as needed for muscle spasms 30 tablet 1    DULoxetine (CYMBALTA) 30 MG capsule Take 30 mg by mouth daily      glucosamine 500 MG CAPS capsule Take 500 mg by mouth 3 times daily      magnesium oxide (MAG-OX) 400 MG tablet Take 800 mg by mouth      omega 3 1000 MG CAPS       St Johns Wort 1000 MG CAPS       SUMAtriptan Succinate Refill (IMITREX) 6 MG/0.5ML SOCT Inject Subcutaneous once           PHYSICAL EXAM     Vital signs  /68 (BP Location: Left arm, Patient Position: Sitting)   Pulse 74   Ht 1.829 m (6')   Wt 102.5 kg (226 lb)   BMI 30.65 kg/m      Weight:   226 lbs 0 oz    Young female who is alert and oriented vital signs are reviewed and documented in electronic medical record.  Neck supple.  Neurologically speech mentation and affect was normal.  Cranial nerves II through XII are intact.  Motor strength 5/5.  Reflexes 1+.  Sensation intact to light touch pinprick toes downgoing.  No dysmetria noted on finger-nose testing gait normal.     PERTINENT DIAGNOSTIC STUDIES     Following  studies were reviewed:     MRI BRAIN 7/6/2023   1. No acute ischemia or other acute intracranial pathology.   2. No other significant intracranial abnormalities.     MRI CERVICAL SPINE 7/6/2023  1. Scattered cervical spondylosis without high-grade spinal canal/neural foraminal stenosis or javire impingement of neural structures.   2. At C5-6, a small central protrusion flattens the thecal sac and contributes to mild spinal canal stenosis.   3. Cervical cord signal is normal. No intradural pathology.     MRI THORACIC SPINE 7/6/2023  1. No spinal canal/neural foraminal stenosis or impingement of neural structures at any thoracic level.   2. Thoracic cord signal is normal. No intradural pathology.     MRI LUMBAR SPINE 7/6/2023  1. No high-grade spinal canal/neural foraminal stenosis or compression of cauda equina nerve roots.  2. At L3-4, a shallow broad-based left foraminal disc protrusion with annular fissure contacts the undersurface of the left L3 nerve root. Mild left neural foraminal stenosis.   3. No intradural pathology. Lower cord/conus and cauda equina nerve roots are normal in appearance.    EEG, 48-hour EEG and EMG reportedly normal done in July 2023 at [facility]    EEG 7/21/2008  This is a mildly abnormal EEG showing low amplitude very high frequency activity in the frontal and temporal leads consistent with benzodiazepine effect. No epileptiform discharges or seizure activity is seen during the recording. This is a surprisingly normal record for a patient reported to have had a generalized seizure 45 minutes before the recording. The relative preservation of background activity would be somewhat unusual in the setting of a post-ictal phase. Clinical correlation is required.     PERTINENT LABS  Following labs were reviewed:  No visits with results within 3 Month(s) from this visit.   Latest known visit with results is:   Admission on 07/05/2023, Discharged on 07/05/2023    Component Date Value Ref Range Status    Sodium 07/05/2023 141  136 - 145 mmol/L Final    Potassium 07/05/2023 4.0  3.4 - 5.3 mmol/L Final    Chloride 07/05/2023 106  98 - 107 mmol/L Final    Carbon Dioxide (CO2) 07/05/2023 26  22 - 29 mmol/L Final    Anion Gap 07/05/2023 9  7 - 15 mmol/L Final    Urea Nitrogen 07/05/2023 13.1  6.0 - 20.0 mg/dL Final    Creatinine 07/05/2023 0.66  0.51 - 0.95 mg/dL Final    Calcium 07/05/2023 9.4  8.6 - 10.0 mg/dL Final    Glucose 07/05/2023 92  70 - 99 mg/dL Final    Alkaline Phosphatase 07/05/2023 55  35 - 104 U/L Final    AST 07/05/2023 17  0 - 45 U/L Final    ALT 07/05/2023 13  0 - 50 U/L Final    Protein Total 07/05/2023 7.2  6.4 - 8.3 g/dL Final    Albumin 07/05/2023 4.5  3.5 - 5.2 g/dL Final    Bilirubin Total 07/05/2023 0.3  <=1.2 mg/dL Final    GFR Estimate 07/05/2023 >90  >60 mL/min/1.73m2 Final    TSH 07/05/2023 0.79  0.30 - 4.20 uIU/mL Final    WBC Count 07/05/2023 7.8  4.0 - 11.0 10e3/uL Final    RBC Count 07/05/2023 4.19  3.80 - 5.20 10e6/uL Final    Hemoglobin 07/05/2023 12.6  11.7 - 15.7 g/dL Final    Hematocrit 07/05/2023 37.5  35.0 - 47.0 % Final    MCV 07/05/2023 90  78 - 100 fL Final    MCH 07/05/2023 30.1  26.5 - 33.0 pg Final    MCHC 07/05/2023 33.6  31.5 - 36.5 g/dL Final    RDW 07/05/2023 11.9  10.0 - 15.0 % Final    Platelet Count 07/05/2023 221  150 - 450 10e3/uL Final    % Neutrophils 07/05/2023 57  % Final    % Lymphocytes 07/05/2023 32  % Final    % Monocytes 07/05/2023 7  % Final    % Eosinophils 07/05/2023 3  % Final    % Basophils 07/05/2023 1  % Final    % Immature Granulocytes 07/05/2023 0  % Final    NRBCs per 100 WBC 07/05/2023 0  <1 /100 Final    Absolute Neutrophils 07/05/2023 4.4  1.6 - 8.3 10e3/uL Final    Absolute Lymphocytes 07/05/2023 2.5  0.8 - 5.3 10e3/uL Final    Absolute Monocytes 07/05/2023 0.6  0.0 - 1.3 10e3/uL Final    Absolute Eosinophils 07/05/2023 0.2  0.0 - 0.7 10e3/uL Final    Absolute Basophils 07/05/2023 0.1  0.0 - 0.2  10e3/uL Final    Absolute Immature Granulocytes 07/05/2023 0.0  <=0.4 10e3/uL Final    Absolute NRBCs 07/05/2023 0.0  10e3/uL Final        Total time spent for face to face visit, reviewing labs/imaging studies, counseling and coordination of care was: 1 Hour spent on the date of the encounter doing chart review, review of outside records, review of test results, interpretation of tests, patient visit, and documentation     This note was dictated using voice recognition software.  Any grammatical or context distortions are unintentional and inherent to the software.    Orders Placed This Encounter   Procedures    ACETYLCHOLINE RECEPTOR BINDING    STRIATED MUSCLE ANTIBODY IGG    ACETYLCHOLINE MODULATING ANTIBODY    ACETYLCHOLINE RECEPTOR BLOCKING HERBERTH    CK total    Anti Nuclear Herberth IgG by IFA with Reflex    Lactic acid whole blood    Pyruvic acid    Lyme Disease Total Abs Bld with Reflex to Confirm CLIA      New Prescriptions    No medications on file      Modified Medications    No medications on file

## 2024-03-01 ENCOUNTER — OFFICE VISIT (OUTPATIENT)
Dept: NEUROLOGY | Facility: CLINIC | Age: 44
End: 2024-03-01
Attending: PHYSICIAN ASSISTANT
Payer: COMMERCIAL

## 2024-03-01 ENCOUNTER — LAB (OUTPATIENT)
Dept: LAB | Facility: HOSPITAL | Age: 44
End: 2024-03-01
Payer: COMMERCIAL

## 2024-03-01 VITALS
DIASTOLIC BLOOD PRESSURE: 68 MMHG | SYSTOLIC BLOOD PRESSURE: 115 MMHG | WEIGHT: 226 LBS | BODY MASS INDEX: 30.61 KG/M2 | HEART RATE: 74 BPM | HEIGHT: 72 IN

## 2024-03-01 DIAGNOSIS — M62.81 GENERALIZED MUSCLE WEAKNESS: ICD-10-CM

## 2024-03-01 DIAGNOSIS — R29.818 FUNCTIONAL NEUROLOGIC COMPLAINT: Primary | ICD-10-CM

## 2024-03-01 LAB
B BURGDOR IGG+IGM SER QL: 0.41
CK SERPL-CCNC: 58 U/L (ref 26–192)
LACTATE SERPL-SCNC: 0.6 MMOL/L (ref 0.7–2)

## 2024-03-01 PROCEDURE — 36415 COLL VENOUS BLD VENIPUNCTURE: CPT

## 2024-03-01 PROCEDURE — 86255 FLUORESCENT ANTIBODY SCREEN: CPT

## 2024-03-01 PROCEDURE — 83516 IMMUNOASSAY NONANTIBODY: CPT | Mod: XU

## 2024-03-01 PROCEDURE — 83516 IMMUNOASSAY NONANTIBODY: CPT

## 2024-03-01 PROCEDURE — 99205 OFFICE O/P NEW HI 60 MIN: CPT | Performed by: PSYCHIATRY & NEUROLOGY

## 2024-03-01 PROCEDURE — 86618 LYME DISEASE ANTIBODY: CPT

## 2024-03-01 PROCEDURE — 83519 RIA NONANTIBODY: CPT

## 2024-03-01 PROCEDURE — 84210 ASSAY OF PYRUVATE: CPT

## 2024-03-01 PROCEDURE — 82550 ASSAY OF CK (CPK): CPT

## 2024-03-01 PROCEDURE — 86038 ANTINUCLEAR ANTIBODIES: CPT

## 2024-03-01 PROCEDURE — 83605 ASSAY OF LACTIC ACID: CPT

## 2024-03-01 NOTE — LETTER
3/1/2024         RE: Darlene Grigsby  [address] [facility] [address]        Dear Colleague,    Thank you for referring your patient, Darlene Grigsby, to the [facility]UR[facility] [place]. Please see a copy of my visit note below.    NEUROLOGY CONSULTATION NOTE       [facility] NEUROLOGY [place]  [address] [place] [address]  Tel: [phone]  Fax: [phone]  [email or web address]     Darlene Grigsby,  1980, MRN [id number]  PCP: Fabrizio Abel  Date: 2024     ASSESSMENT & PLAN     Visit Diagnosis  Functional neurologic complaint     Generalized muscle weakness  43-year-old female with history of bipolar disorder, migraine headache without aura who was referred for second opinion of progressive weakness in the legs since May 2022.  She had an extensive workup at [facility], [facility], [facility] and [facility] that included MRI brain, complete spine, 48-hour EEG, EMG and lab work that were normal.  She was diagnosed with functional neurological disorder and psychiatry evaluation was recommended.  However she did not feel her symptoms were related to depression/anxiety and wanted a second opinion.  I have informed her that she already had an extensive workup and I would not recommend repeating any more test except for lab work to include acetylcholine receptor antibodies, NATALIE, CK, lactate, pyruvate and Lyme titer.  If these tests are normal, her symptoms likely will be due to conversion disorder and I have strongly encouraged her to see a psychiatrist and a psychologist to better address her underlying mental health issues.  Follow-up will be on as needed basis.    Thank you again for this referral, please feel free to contact me if you have any questions.    Kelvin Salazar MD  [facility]     REASON FOR CONSULTATION Generalized Weakness        HISTORY OF PRESENT ILLNESS     We have been requested by  Fabrizio Abel to evaluate Darlene Grigsby who is a 43 year old  female for weakness    Patient is a 43-year-old female with a history of bipolar disorder, BPPV, migraine headache who was referred for a second opinion of progressive weakness in the legs since May 2022.  According to patient she is unable to walk more than 200 feet and has been using a wheelchair at home due to severe symptoms she was admitted to the hospital in May 2023 and had reported intermittent twitches but her EEG and 48-hour EEG were normal.  MRI brain was also within normal limits.  The symptoms started after she started a full-time job and was treated with gabapentin.  Subsequently she was admitted to [facility] facility for similar symptoms and they felt patient had  functional weakness.  Apparently years ago she was evaluated at [facility]) for head bobbing and stuttering speech & had a  negative workup.  In July 2023 she had MRI of the brain and complete spine that was normal.  Lab work included normal Lyme titer, Babesia antibody, HIV, hepatitis B surface antigen, B12.  EMG was also done in July 2023 that was normal.  She was diagnosed with functional neurological symptoms and was encouraged to follow-up with a psychiatrist.  However she is not convinced that her symptoms are psychological and has not seen a psychiatrist and only sees a psychologist.  She feels her weakness got worse after she was exposed to COVID-19 and is worried that she might not be able to continue working due to her symptoms     PROBLEM LIST   Patient Active Problem List   Diagnosis Code     Dysplasia of cervix N87.9     Bipolar disorder (H) F31.9     BPPV (benign paroxysmal positional vertigo) H81.10     IUD (intrauterine device) in place Z97.5     Migraine without aura and without status migrainosus, not intractable G43.009     Premenstrual tension syndrome N94.3     Bilateral leg weakness R29.898         PAST MEDICAL & SURGICAL HISTORY     Past Medical History:    Patient  has a past medical history of Abnormal Pap smear of cervix (2005).    Surgical History:  She  has a past surgical history that includes leep tx, cervical (05/20/2005).     SOCIAL HISTORY     Reviewed, and she  reports that she has never smoked. She has never been exposed to tobacco smoke. She has never used smokeless tobacco. She reports current alcohol use. She reports that she does not use drugs.     FAMILY HISTORY     Reviewed, and family history includes Breast Cancer in her maternal grandmother and another family member; Fibromyalgia in her mother.     ALLERGIES     Allergies   Allergen Reactions     Contrast Dye      Diphenhydramine Other (See Comments) and Unknown     agitation     Macrobid [Nitrofurantoin]      Felt like med was giving her seizures, did see flickering lights while on it.         REVIEW OF SYSTEMS     A 12 point review of system was performed and was negative except as outlined in the history of present illness.     HOME MEDICATIONS     Current Outpatient Rx   Medication Sig Dispense Refill     aspirin-acetaminophen-caffeine (EXCEDRIN MIGRAINE) 250-250-65 MG tablet        carisoprodol (SOMA) 350 MG tablet Take 1 tablet (350 mg) by mouth 4 times daily as needed for muscle spasms 30 tablet 1     DULoxetine (CYMBALTA) 30 MG capsule Take 30 mg by mouth daily       glucosamine 500 MG CAPS capsule Take 500 mg by mouth 3 times daily       magnesium oxide (MAG-OX) 400 MG tablet Take 800 mg by mouth       omega 3 1000 MG CAPS        St Johns Wort 1000 MG CAPS        SUMAtriptan Succinate Refill (IMITREX) 6 MG/0.5ML SOCT Inject Subcutaneous once           PHYSICAL EXAM     Vital signs  /68 (BP Location: Left arm, Patient Position: Sitting)   Pulse 74   Ht 1.829 m (6')   Wt 102.5 kg (226 lb)   BMI 30.65 kg/m      Weight:   226 lbs 0 oz    Young female who is alert and oriented vital signs are reviewed and documented in electronic medical record.  Neck supple.  Neurologically speech  mentation and affect was normal.  Cranial nerves II through XII are intact.  Motor strength 5/5.  Reflexes 1+.  Sensation intact to light touch pinprick toes downgoing.  No dysmetria noted on finger-nose testing gait normal.     PERTINENT DIAGNOSTIC STUDIES     Following studies were reviewed:     MRI BRAIN 7/6/2023   1. No acute ischemia or other acute intracranial pathology.   2. No other significant intracranial abnormalities.     MRI CERVICAL SPINE 7/6/2023  1. Scattered cervical spondylosis without high-grade spinal canal/neural foraminal stenosis or javier impingement of neural structures.   2. At C5-6, a small central protrusion flattens the thecal sac and contributes to mild spinal canal stenosis.   3. Cervical cord signal is normal. No intradural pathology.     MRI THORACIC SPINE 7/6/2023  1. No spinal canal/neural foraminal stenosis or impingement of neural structures at any thoracic level.   2. Thoracic cord signal is normal. No intradural pathology.     MRI LUMBAR SPINE 7/6/2023  1. No high-grade spinal canal/neural foraminal stenosis or compression of cauda equina nerve roots.  2. At L3-4, a shallow broad-based left foraminal disc protrusion with annular fissure contacts the undersurface of the left L3 nerve root. Mild left neural foraminal stenosis.   3. No intradural pathology. Lower cord/conus and cauda equina nerve roots are normal in appearance.    EEG, 48-hour EEG and EMG reportedly normal done in July 2023 at [facility]    EEG 7/21/2008  This is a mildly abnormal EEG showing low amplitude very high frequency activity in the frontal and temporal leads consistent with benzodiazepine effect. No epileptiform discharges or seizure activity is seen during the recording. This is a surprisingly normal record for a patient reported to have had a generalized seizure 45 minutes before the recording. The relative preservation of background activity would be somewhat unusual in the  setting of a post-ictal phase. Clinical correlation is required.     PERTINENT LABS  Following labs were reviewed:  No visits with results within 3 Month(s) from this visit.   Latest known visit with results is:   Admission on 07/05/2023, Discharged on 07/05/2023   Component Date Value Ref Range Status     Sodium 07/05/2023 141  136 - 145 mmol/L Final     Potassium 07/05/2023 4.0  3.4 - 5.3 mmol/L Final     Chloride 07/05/2023 106  98 - 107 mmol/L Final     Carbon Dioxide (CO2) 07/05/2023 26  22 - 29 mmol/L Final     Anion Gap 07/05/2023 9  7 - 15 mmol/L Final     Urea Nitrogen 07/05/2023 13.1  6.0 - 20.0 mg/dL Final     Creatinine 07/05/2023 0.66  0.51 - 0.95 mg/dL Final     Calcium 07/05/2023 9.4  8.6 - 10.0 mg/dL Final     Glucose 07/05/2023 92  70 - 99 mg/dL Final     Alkaline Phosphatase 07/05/2023 55  35 - 104 U/L Final     AST 07/05/2023 17  0 - 45 U/L Final     ALT 07/05/2023 13  0 - 50 U/L Final     Protein Total 07/05/2023 7.2  6.4 - 8.3 g/dL Final     Albumin 07/05/2023 4.5  3.5 - 5.2 g/dL Final     Bilirubin Total 07/05/2023 0.3  <=1.2 mg/dL Final     GFR Estimate 07/05/2023 >90  >60 mL/min/1.73m2 Final     TSH 07/05/2023 0.79  0.30 - 4.20 uIU/mL Final     WBC Count 07/05/2023 7.8  4.0 - 11.0 10e3/uL Final     RBC Count 07/05/2023 4.19  3.80 - 5.20 10e6/uL Final     Hemoglobin 07/05/2023 12.6  11.7 - 15.7 g/dL Final     Hematocrit 07/05/2023 37.5  35.0 - 47.0 % Final     MCV 07/05/2023 90  78 - 100 fL Final     MCH 07/05/2023 30.1  26.5 - 33.0 pg Final     MCHC 07/05/2023 33.6  31.5 - 36.5 g/dL Final     RDW 07/05/2023 11.9  10.0 - 15.0 % Final     Platelet Count 07/05/2023 221  150 - 450 10e3/uL Final     % Neutrophils 07/05/2023 57  % Final     % Lymphocytes 07/05/2023 32  % Final     % Monocytes 07/05/2023 7  % Final     % Eosinophils 07/05/2023 3  % Final     % Basophils 07/05/2023 1  % Final     % Immature Granulocytes 07/05/2023 0  % Final     NRBCs per 100 WBC 07/05/2023 0  <1 /100 Final      Absolute Neutrophils 07/05/2023 4.4  1.6 - 8.3 10e3/uL Final     Absolute Lymphocytes 07/05/2023 2.5  0.8 - 5.3 10e3/uL Final     Absolute Monocytes 07/05/2023 0.6  0.0 - 1.3 10e3/uL Final     Absolute Eosinophils 07/05/2023 0.2  0.0 - 0.7 10e3/uL Final     Absolute Basophils 07/05/2023 0.1  0.0 - 0.2 10e3/uL Final     Absolute Immature Granulocytes 07/05/2023 0.0  <=0.4 10e3/uL Final     Absolute NRBCs 07/05/2023 0.0  10e3/uL Final        Total time spent for face to face visit, reviewing labs/imaging studies, counseling and coordination of care was: 1 Hour spent on the date of the encounter doing chart review, review of outside records, review of test results, interpretation of tests, patient visit, and documentation     This note was dictated using voice recognition software.  Any grammatical or context distortions are unintentional and inherent to the software.    Orders Placed This Encounter   Procedures     ACETYLCHOLINE RECEPTOR BINDING     STRIATED MUSCLE ANTIBODY IGG     ACETYLCHOLINE MODULATING ANTIBODY     ACETYLCHOLINE RECEPTOR BLOCKING HERBERTH     CK total     Anti Nuclear Herberth IgG by IFA with Reflex     Lactic acid whole blood     Pyruvic acid     Lyme Disease Total Abs Bld with Reflex to Confirm CLIA      New Prescriptions    No medications on file      Modified Medications    No medications on file                Again, thank you for allowing me to participate in the care of your patient.        Sincerely,        Kelvin Salazar MD

## 2024-03-01 NOTE — NURSING NOTE
Chief Complaint   Patient presents with    Generalized Weakness     Patient reports intermittent weakness in BLE and moves throughout the body. She reports no falls. First episode was 20+ years ago     Michelle Jones CMA on 3/1/2024 at 9:48 AM  [facility]

## 2024-03-03 LAB
ACHR BLOCK AB/ACHR TOTAL SFR SER: 13 %
ACHR MOD AB/ACHR TOTAL SFR SER: 1 %
PYRUVATE BLD-SCNC: 0.05 MMOL/L
STRIA MUS IGG SER QL IF: NORMAL

## 2024-03-04 LAB
ACHR BIND AB SER-SCNC: 0 NMOL/L
ANA SER QL IF: NEGATIVE

## 2024-04-07 ENCOUNTER — HEALTH MAINTENANCE LETTER (OUTPATIENT)
Age: 44
End: 2024-04-07

## 2024-06-16 ENCOUNTER — HEALTH MAINTENANCE LETTER (OUTPATIENT)
Age: 44
End: 2024-06-16

## 2025-06-21 ENCOUNTER — HEALTH MAINTENANCE LETTER (OUTPATIENT)
Age: 45
End: 2025-06-21